# Patient Record
Sex: MALE | Race: WHITE | NOT HISPANIC OR LATINO | Employment: OTHER | URBAN - NONMETROPOLITAN AREA
[De-identification: names, ages, dates, MRNs, and addresses within clinical notes are randomized per-mention and may not be internally consistent; named-entity substitution may affect disease eponyms.]

---

## 2017-01-17 ENCOUNTER — HOSPITAL ENCOUNTER (OUTPATIENT)
Dept: ULTRASOUND IMAGING | Facility: HOSPITAL | Age: 82
Discharge: HOME | End: 2017-01-17
Attending: UROLOGY | Admitting: UROLOGY

## 2017-01-17 ENCOUNTER — OFFICE VISIT (OUTPATIENT)
Dept: UROLOGY | Facility: CLINIC | Age: 82
End: 2017-01-17

## 2017-01-17 VITALS
DIASTOLIC BLOOD PRESSURE: 82 MMHG | SYSTOLIC BLOOD PRESSURE: 140 MMHG | BODY MASS INDEX: 22.75 KG/M2 | HEIGHT: 69 IN | TEMPERATURE: 97.5 F | WEIGHT: 153.6 LBS

## 2017-01-17 DIAGNOSIS — N20.0 KIDNEY STONE: Primary | ICD-10-CM

## 2017-01-17 DIAGNOSIS — N28.1 RENAL CYST: ICD-10-CM

## 2017-01-17 DIAGNOSIS — N20.0 KIDNEY STONE: ICD-10-CM

## 2017-01-17 LAB
BILIRUB BLD-MCNC: NEGATIVE MG/DL
CLARITY, POC: CLEAR
COLOR UR: YELLOW
GLUCOSE UR STRIP-MCNC: NEGATIVE MG/DL
KETONES UR QL: NEGATIVE
LEUKOCYTE EST, POC: NEGATIVE
NITRITE UR-MCNC: NEGATIVE MG/ML
PH UR: 5.5 [PH] (ref 5–8)
PROT UR STRIP-MCNC: NEGATIVE MG/DL
RBC # UR STRIP: NEGATIVE /UL
SP GR UR: 1.02 (ref 1–1.03)
UROBILINOGEN UR QL: NORMAL

## 2017-01-17 PROCEDURE — 81003 URINALYSIS AUTO W/O SCOPE: CPT | Performed by: UROLOGY

## 2017-01-17 PROCEDURE — 76775 US EXAM ABDO BACK WALL LIM: CPT

## 2017-01-17 PROCEDURE — 99214 OFFICE O/P EST MOD 30 MIN: CPT | Performed by: UROLOGY

## 2017-01-17 NOTE — MR AVS SNAPSHOT
Jb Verde   1/17/2017 9:10 AM   Office Visit    Dept Phone:  [phone]   Encounter #:  [id number]    Provider:  Rolf Stanley MD   Department:  [facility] UROLOGY                Your Full Care Plan              Today's Medication Changes          These changes are accurate as of: 1/17/17  9:13 AM.  If you have any questions, ask your nurse or doctor.               Stop taking medication(s)listed here:     ciprofloxacin 500 MG tablet   Commonly known as:  CIPRO   Stopped by:  Rolf Stanley MD                      Your Updated Medication List          This list is accurate as of: 1/17/17  9:13 AM.  Always use your most recent med list.                atorvastatin 80 MG tablet   Commonly known as:  LIPITOR       capsicum 0.075 % topical cream   Commonly known as:  ZOSTRIX       carboxymethylcellulose 0.5 % solution   Commonly known as:  REFRESH PLUS       DEXILANT 60 MG capsule   Generic drug:  dexlansoprazole       docusate sodium 100 MG capsule   Commonly known as:  COLACE       finasteride 5 MG tablet   Commonly known as:  PROSCAR       galantamine ER 8 MG 24 hr capsule   Commonly known as:  RAZADYNE ER       HYDROcodone-acetaminophen  MG per tablet   Commonly known as:  NORCO   Take 1 tablet by mouth 3 (Three) Times a Day As Needed for moderate pain (4-6). AS NEEDED FOR PAIN       ibuprofen 600 MG tablet   Commonly known as:  ADVIL,MOTRIN       loratadine 10 MG tablet   Commonly known as:  CLARITIN       mirtazapine 30 MG tablet   Commonly known as:  REMERON       MULTIVITAMIN ADULT PO       polyethylene glycol packet   Commonly known as:  MIRALAX       potassium chloride 20 MEQ packet   Commonly known as:  KLOR-CON       senna 8.6 MG tablet   Commonly known as:  SENOKOT       sertraline 50 MG tablet   Commonly known as:  ZOLOFT       tamsulosin 0.4 MG capsule 24 hr capsule   Commonly known as:  FLOMAX       VITAMIN D PO               We Performed  "the Following     POC Urinalysis Dipstick, Automated     PTH, Intact & Calcium     Uric Acid     UroStone Max24       You Were Diagnosed With        Codes Comments    Kidney stone    -  Primary ICD-10-CM: N20.0  ICD-9-CM: 592.0     Renal cyst     ICD-10-CM: N28.1  ICD-9-CM: 753.10       Instructions     None    Patient Instructions History      Upcoming Appointments     Visit Type Date Time Department    FOLLOW UP 2017  9:10 AM [facility] UROLOGY PAD    US PAD RENAL BILATERAL 2017  8:30 AM  PAD US BIC    FOLLOW UP 2017  2:30 PM [facility] UROLOGY PAD      MyChart Signup     [facility] [place] [employer] allows you to send messages to your doctor, view your test results, renew your prescriptions, schedule appointments, and more. To sign up, go to Live[employer] and click on the Sign Up Now link in the New User? box. Enter your [employer] Activation Code exactly as it appears below along with the last four digits of your Social Security Number and your Date of Birth () to complete the sign-up process. If you do not sign up before the expiration date, you must request a new code.    [employer] Activation Code: [email or web address]  Expires: 2017  9:13 AM    If you have questions, you can email [email or web address] or call [phone] to talk to our [employer] staff. Remember, [employer] is NOT to be used for urgent needs. For medical emergencies, dial 911.               Other Info from Your Visit           Your Appointments     2017  2:30 PM CST   Follow Up with Rolf Stanley MD   [facility] (--)    [address] [place] [address] [address]  [facility] [id number] [phone]           Arrive 15 minutes prior to appointment.              Allergies     Aspirin      Other reaction(s): GI BLEEDING      Reason for Visit     Flank Pain           Vital Signs     Blood Pressure Temperature Height Weight Body Mass Index Smoking Status    140/82 97.5 °F (36.4 °C) 69\" (175.3 cm) 153 lb 9.6 oz " (69.7 kg) 22.68 kg/m2 Former Smoker      Problems and Diagnoses Noted     Kidney stone    Kidney lump      Results     POC Urinalysis Dipstick, Automated      Component Value Standard Range & Units    Color Yellow Yellow, Straw, Dark Yellow, Adriane    Clarity, UA Clear Clear    Glucose, UA Negative Negative, 1000 mg/dL (3+) mg/dL    Bilirubin Negative Negative    Ketones, UA Negative Negative    Specific Gravity  1.025 1.005 - 1.030    Blood, UA Negative Negative    pH, Urine 5.5 5.0 - 8.0    Protein, POC Negative Negative mg/dL    Urobilinogen, UA Normal Normal    Leukocytes Negative Negative    Nitrite, UA Negative Negative

## 2017-01-17 NOTE — LETTER
January 17, 2017     Tamiko Vizcaino MD  [address] [facility] [place] [address]    Patient: Jb Verde   YOB: 1934   Date of Visit: 1/17/2017       Dear Dr. Carrillo MD:    Thank you for referring Jb Verde to me for evaluation. Below are the relevant portions of my assessment and plan of care.    If you have questions, please do not hesitate to call me. I look forward to following Jb along with you.         Sincerely,        Rolf Stanley MD        CC: No Recipients  Rolf Stanley MD  1/17/2017 11:33 AM  Signed  Mr. Verde is 82 y.o. male    Chief Complaint   Patient presents with   • Nephrolithiasis       History of Present Illness  Recent stone episode  The context of today's visit is following a recent stone episode The patient underwent URS management of the stone  6  week(s) ago. The stone location is in the Upper pole, Lower pole and Interpolar area. The onset of this was acute. The course is improving with this management. Associated symptom(s) can be described by resolution after treatment of stone. The patient did get films to review today.     The following portions of the patient's history were reviewed and updated as appropriate: allergies, current medications, past family history, past medical history, past social history, past surgical history and problem list.    Review of Systems   Constitutional: Negative for chills and fever.   Gastrointestinal: Negative for abdominal pain, anal bleeding and blood in stool.   Genitourinary: Positive for difficulty urinating and frequency. Negative for flank pain, hematuria, penile pain, penile swelling and urgency.         Current Outpatient Prescriptions:   •  atorvastatin (LIPITOR) 80 MG tablet, Take 80 mg by mouth Daily., Disp: , Rfl:   •  capsicum (ZOSTRIX) 0.075 % topical cream, Apply 1 application topically 4 (Four) Times a Day. TO KNEE JOINTS FOR PAIN, Disp: , Rfl:   •  carboxymethylcellulose (REFRESH PLUS) 0.5 %  solution, Administer 1 drop to both eyes 4 (Four) Times a Day As Needed for dry eyes (AS NEEDED DRY EYES)., Disp: , Rfl:   •  Cholecalciferol (VITAMIN D PO), Take 2,000 Units by mouth Daily., Disp: , Rfl:   •  dexlansoprazole (DEXILANT) 60 MG capsule, Take 60 mg by mouth Daily., Disp: , Rfl:   •  docusate sodium (COLACE) 100 MG capsule, Take 100 mg by mouth 2 (Two) Times a Day., Disp: , Rfl:   •  finasteride (PROSCAR) 5 MG tablet, Take 5 mg by mouth Daily., Disp: , Rfl:   •  galantamine ER (RAZADYNE ER) 8 MG 24 hr capsule, Take 8 mg by mouth Daily With Breakfast., Disp: , Rfl:   •  HYDROcodone-acetaminophen (NORCO)  MG per tablet, Take 1 tablet by mouth 3 (Three) Times a Day As Needed for moderate pain (4-6). AS NEEDED FOR PAIN, Disp: 20 tablet, Rfl: 0  •  ibuprofen (ADVIL,MOTRIN) 600 MG tablet, Take 600 mg by mouth 3 (Three) Times a Day., Disp: , Rfl:   •  loratadine (CLARITIN) 10 MG tablet, Take 10 mg by mouth Daily., Disp: , Rfl:   •  mirtazapine (REMERON) 30 MG tablet, Take 30 mg by mouth Every Night. TAKES 1/2 TAB, Disp: , Rfl:   •  Multiple Vitamins-Minerals (MULTIVITAMIN ADULT PO), Take 1 tablet by mouth Daily., Disp: , Rfl:   •  polyethylene glycol (MIRALAX) packet, Take 17 g by mouth Daily., Disp: , Rfl:   •  potassium chloride (KLOR-CON) 20 MEQ packet, Take 20 mEq by mouth Daily. PT TAKING 1/2 20 MEQ TABLET, Disp: , Rfl:   •  senna (SENOKOT) 8.6 MG tablet, Take 1 tablet by mouth 2 (Two) Times a Day., Disp: , Rfl:   •  sertraline (ZOLOFT) 50 MG tablet, Take 50 mg by mouth Daily. TAKES 1/2 TAB, Disp: , Rfl:   •  tamsulosin (FLOMAX) 0.4 MG capsule 24 hr capsule, Take 0.4 mg by mouth Daily. TAKES 2 CAPSULES, Disp: , Rfl:     Past Medical History   Diagnosis Date   • Acid reflux    • Allergic rhinitis    • Arthritis    • Monique esophagus    • Carotid atherosclerosis    • Cognitive impairment    • Delirium    • Folic acid deficiency    • Hyperlipemia    • Hypertension    • Kidney stone    • Nail dystrophy  "   • Osteoarthritis of knee    • Peripheral vascular disease    • Polyp of colon    • Transient cerebral ischemia    • Traumatic amputation of thumb    • Urinary tract infection    • Vitamin B12 deficiency    • Vitamin D deficiency        Past Surgical History   Procedure Laterality Date   • Gallbladder surgery     • Kidney stone surgery     • Other surgical history Right 1958     R/T TRAUMATIC INJURY AT WORK    • Cystoscopy w/ ureteral stent placement Left 11/16/2016     Procedure: CYSTOSCOPY WITH LEFT DOUBLE J STENT INSERTION WITH LEFT EXTRACORPOREAL SHOCKWAVE LITHOTRIPSY;  Surgeon: Rolf Stanley MD;  Location: [facility] OR;  Service:    • Cystoscopy ureteroscopy laser lithotripsy Left 12/5/2016     Procedure: CYSTOSCOPY URETEROSCOPY LASER LITHOTRIPSY;  Surgeon: Rolf Stanley MD;  Location: [facility] OR;  Service:        Social History     Social History   • Marital status:      Spouse name: N/A   • Number of children: N/A   • Years of education: N/A     Social History Main Topics   • Smoking status: Former Smoker   • Smokeless tobacco: Never Used      Comment: 1960   • Alcohol use No   • Drug use: No   • Sexual activity: Not Asked     Other Topics Concern   • None     Social History Narrative       Family History   Problem Relation Age of Onset   • Breast cancer Mother    • Colon cancer Maternal Aunt        Objective    Visit Vitals   • /82   • Temp 97.5 °F (36.4 °C)   • Ht 69\" (175.3 cm)   • Wt 153 lb 9.6 oz (69.7 kg)   • BMI 22.68 kg/m2       Physical Exam    Admission on 12/05/2016, Discharged on 12/05/2016   Component Date Value Ref Range Status   • Case Report 12/05/2016    Final                    Value:Surgical Pathology Report                         Case: [id number]                                  Authorizing Provider:  Rolf Stanley MD     Collected:           12/05/2016 03:45 PM          Ordering Location:     [facility] OR  Received:            12/06/2016 10:08 AM "          Pathologist:           Sirisha Porter MD                                                           Specimen:    Kidney, Left, LEFT KIDNEY STONE FOR ANALYSIS PLEASE                                       • Clinical Information 12/05/2016    Final                    Value:This result contains rich text formatting which cannot be displayed here.   • Final Diagnosis 12/05/2016    Final                    Value:This result contains rich text formatting which cannot be displayed here.   • Gross Description 12/05/2016    Final                    Value:This result contains rich text formatting which cannot be displayed here.       Results for orders placed or performed in visit on 01/17/17   POC Urinalysis Dipstick, Automated   Result Value Ref Range    Color Yellow Yellow, Straw, Dark Yellow, Adriane    Clarity, UA Clear Clear    Glucose, UA Negative Negative, 1000 mg/dL (3+) mg/dL    Bilirubin Negative Negative    Ketones, UA Negative Negative    Specific Gravity  1.025 1.005 - 1.030    Blood, UA Negative Negative    pH, Urine 5.5 5.0 - 8.0    Protein, POC Negative Negative mg/dL    Urobilinogen, UA Normal Normal    Leukocytes Negative Negative    Nitrite, UA Negative Negative     Assessment and Plan    Jb was seen today for nephrolithiasis.    Diagnoses and all orders for this visit:    Kidney stone  -     POC Urinalysis Dipstick, Automated  -     UroStone Max24  -     PTH, Intact & Calcium  -     Uric Acid  Reviewed his ultrasound as below.  There is a question of a left renal stone however I think this is likely artifact as his stones were cleared on his last procedure.  He does however still have right-sided stones.  This is of small moderate stone burden.  I think given his overall health and recommended continuing to watch the stones on the right.  He has family agree.  They understand the signs and symptoms of obstructing stone and will call if any problems.  Given his stone burden I think it would be  important to perform a full metabolic workup with the labs as above.  His stones are calcium oxalate stones.  I will see him back in a few weeks with a 24-hour urine.  We will get labs today.    Mr. Verde has been diagnosed with renal urolithiasis. The patient's options for therapy are discussed based on the size, location, stone burden, and symptoms.  The decision has been made to follow these stones radiographically without treatment.  The patient understands the risks associated with the conservative approach, but this is a reasonable treatment plan.  The need to contract me for hematuria, fever, recurrent UTI's, flank pain or change in ideology is explained.      Renal cyst  The mass on CT scan in question on the right kidney is a benign renal cyst.  No further workup needed.    Renal ultrasound independent review    The renal ultrasound is available for me to review.  Treatment recommendations require an independent review.  This film has been reviewed by the radiologist to determine any non urologic abnormalities that are presents.  However, I very closely inspected the kidneys for size, symmetry, contour, parenchymal thickness, perinephric reaction, presence of calcifications, and intrarenal dilation of the collecting system.       The right kidney appears abnormal on this ultrasound.     Benign-appearing cysts in the upper pole.  Multiple hyperechoic lesions with shadowing consistent with multiple stones.    The left kidney appears abnormal on this ultrasound.     No hydronephrosis.  There is a hyperechoic lesion in the interpolar region with question of shadowing.  This could represent artifact versus a stone    The bladder appears normal on thisultrsaound.  The bladder appears normal in thickness.  There no masses or stones seen on this exam.  Enlarged prostate    EMR Dragon/Transcription disclaimer:  Much of this encounter note is an electronic transcription/translation of spoken language to printed text.  The electronic translation of spoken language may permit erroneous, or at times, nonsensical words or phrases to be inadvertently transcribed; although I have reviewed the note for such errors, some may still exist.

## 2017-01-17 NOTE — PROGRESS NOTES
Mr. Verde is 82 y.o. male    Chief Complaint   Patient presents with   • Nephrolithiasis       History of Present Illness  Recent stone episode  The context of today's visit is following a recent stone episode The patient underwent URS management of the stone  6  week(s) ago. The stone location is in the Upper pole, Lower pole and Interpolar area. The onset of this was acute. The course is improving with this management. Associated symptom(s) can be described by resolution after treatment of stone. The patient did get films to review today.     The following portions of the patient's history were reviewed and updated as appropriate: allergies, current medications, past family history, past medical history, past social history, past surgical history and problem list.    Review of Systems   Constitutional: Negative for chills and fever.   Gastrointestinal: Negative for abdominal pain, anal bleeding and blood in stool.   Genitourinary: Positive for difficulty urinating and frequency. Negative for flank pain, hematuria, penile pain, penile swelling and urgency.         Current Outpatient Prescriptions:   •  atorvastatin (LIPITOR) 80 MG tablet, Take 80 mg by mouth Daily., Disp: , Rfl:   •  capsicum (ZOSTRIX) 0.075 % topical cream, Apply 1 application topically 4 (Four) Times a Day. TO KNEE JOINTS FOR PAIN, Disp: , Rfl:   •  carboxymethylcellulose (REFRESH PLUS) 0.5 % solution, Administer 1 drop to both eyes 4 (Four) Times a Day As Needed for dry eyes (AS NEEDED DRY EYES)., Disp: , Rfl:   •  Cholecalciferol (VITAMIN D PO), Take 2,000 Units by mouth Daily., Disp: , Rfl:   •  dexlansoprazole (DEXILANT) 60 MG capsule, Take 60 mg by mouth Daily., Disp: , Rfl:   •  docusate sodium (COLACE) 100 MG capsule, Take 100 mg by mouth 2 (Two) Times a Day., Disp: , Rfl:   •  finasteride (PROSCAR) 5 MG tablet, Take 5 mg by mouth Daily., Disp: , Rfl:   •  galantamine ER (RAZADYNE ER) 8 MG 24 hr capsule, Take 8 mg by mouth Daily With  Breakfast., Disp: , Rfl:   •  HYDROcodone-acetaminophen (NORCO)  MG per tablet, Take 1 tablet by mouth 3 (Three) Times a Day As Needed for moderate pain (4-6). AS NEEDED FOR PAIN, Disp: 20 tablet, Rfl: 0  •  ibuprofen (ADVIL,MOTRIN) 600 MG tablet, Take 600 mg by mouth 3 (Three) Times a Day., Disp: , Rfl:   •  loratadine (CLARITIN) 10 MG tablet, Take 10 mg by mouth Daily., Disp: , Rfl:   •  mirtazapine (REMERON) 30 MG tablet, Take 30 mg by mouth Every Night. TAKES 1/2 TAB, Disp: , Rfl:   •  Multiple Vitamins-Minerals (MULTIVITAMIN ADULT PO), Take 1 tablet by mouth Daily., Disp: , Rfl:   •  polyethylene glycol (MIRALAX) packet, Take 17 g by mouth Daily., Disp: , Rfl:   •  potassium chloride (KLOR-CON) 20 MEQ packet, Take 20 mEq by mouth Daily. PT TAKING 1/2 20 MEQ TABLET, Disp: , Rfl:   •  senna (SENOKOT) 8.6 MG tablet, Take 1 tablet by mouth 2 (Two) Times a Day., Disp: , Rfl:   •  sertraline (ZOLOFT) 50 MG tablet, Take 50 mg by mouth Daily. TAKES 1/2 TAB, Disp: , Rfl:   •  tamsulosin (FLOMAX) 0.4 MG capsule 24 hr capsule, Take 0.4 mg by mouth Daily. TAKES 2 CAPSULES, Disp: , Rfl:     Past Medical History   Diagnosis Date   • Acid reflux    • Allergic rhinitis    • Arthritis    • Monique esophagus    • Carotid atherosclerosis    • Cognitive impairment    • Delirium    • Folic acid deficiency    • Hyperlipemia    • Hypertension    • Kidney stone    • Nail dystrophy    • Osteoarthritis of knee    • Peripheral vascular disease    • Polyp of colon    • Transient cerebral ischemia    • Traumatic amputation of thumb    • Urinary tract infection    • Vitamin B12 deficiency    • Vitamin D deficiency        Past Surgical History   Procedure Laterality Date   • Gallbladder surgery     • Kidney stone surgery     • Other surgical history Right 1958     R/T TRAUMATIC INJURY AT WORK    • Cystoscopy w/ ureteral stent placement Left 11/16/2016     Procedure: CYSTOSCOPY WITH LEFT DOUBLE J STENT INSERTION WITH LEFT EXTRACORPOREAL  "SHOCKWAVE LITHOTRIPSY;  Surgeon: Rolf Stanley MD;  Location: [facility];  Service:    • Cystoscopy ureteroscopy laser lithotripsy Left 12/5/2016     Procedure: CYSTOSCOPY URETEROSCOPY LASER LITHOTRIPSY;  Surgeon: Rolf Stanley MD;  Location: [facility];  Service:        Social History     Social History   • Marital status:      Spouse name: N/A   • Number of children: N/A   • Years of education: N/A     Social History Main Topics   • Smoking status: Former Smoker   • Smokeless tobacco: Never Used      Comment: 1960   • Alcohol use No   • Drug use: No   • Sexual activity: Not Asked     Other Topics Concern   • None     Social History Narrative       Family History   Problem Relation Age of Onset   • Breast cancer Mother    • Colon cancer Maternal Aunt        Objective    Visit Vitals   • /82   • Temp 97.5 °F (36.4 °C)   • Ht 69\" (175.3 cm)   • Wt 153 lb 9.6 oz (69.7 kg)   • BMI 22.68 kg/m2       Physical Exam    Admission on 12/05/2016, Discharged on 12/05/2016   Component Date Value Ref Range Status   • Case Report 12/05/2016    Final                    Value:Surgical Pathology Report                         Case: [id number]                                  Authorizing Provider:  Rolf Stanley MD     Collected:           12/05/2016 03:45 PM          Ordering Location:     [facility]  Received:            12/06/2016 10:08 AM          Pathologist:           Sirisha Porter MD                                                           Specimen:    Kidney, Left, LEFT KIDNEY STONE FOR ANALYSIS PLEASE                                       • Clinical Information 12/05/2016    Final                    Value:This result contains rich text formatting which cannot be displayed here.   • Final Diagnosis 12/05/2016    Final                    Value:This result contains rich text formatting which cannot be displayed here.   • Gross Description 12/05/2016    Final                    " Value:This result contains rich text formatting which cannot be displayed here.       Results for orders placed or performed in visit on 01/17/17   POC Urinalysis Dipstick, Automated   Result Value Ref Range    Color Yellow Yellow, Straw, Dark Yellow, Adriane    Clarity, UA Clear Clear    Glucose, UA Negative Negative, 1000 mg/dL (3+) mg/dL    Bilirubin Negative Negative    Ketones, UA Negative Negative    Specific Gravity  1.025 1.005 - 1.030    Blood, UA Negative Negative    pH, Urine 5.5 5.0 - 8.0    Protein, POC Negative Negative mg/dL    Urobilinogen, UA Normal Normal    Leukocytes Negative Negative    Nitrite, UA Negative Negative     Assessment and Plan    Jb was seen today for nephrolithiasis.    Diagnoses and all orders for this visit:    Kidney stone  -     POC Urinalysis Dipstick, Automated  -     UroStone Max24  -     PTH, Intact & Calcium  -     Uric Acid  Reviewed his ultrasound as below.  There is a question of a left renal stone however I think this is likely artifact as his stones were cleared on his last procedure.  He does however still have right-sided stones.  This is of small moderate stone burden.  I think given his overall health and recommended continuing to watch the stones on the right.  He has family agree.  They understand the signs and symptoms of obstructing stone and will call if any problems.  Given his stone burden I think it would be important to perform a full metabolic workup with the labs as above.  His stones are calcium oxalate stones.  I will see him back in a few weeks with a 24-hour urine.  We will get labs today.    Mr. Verde has been diagnosed with renal urolithiasis. The patient's options for therapy are discussed based on the size, location, stone burden, and symptoms.  The decision has been made to follow these stones radiographically without treatment.  The patient understands the risks associated with the conservative approach, but this is a reasonable treatment  plan.  The need to contract me for hematuria, fever, recurrent UTI's, flank pain or change in ideology is explained.      Renal cyst  The mass on CT scan in question on the right kidney is a benign renal cyst.  No further workup needed.    Renal ultrasound independent review    The renal ultrasound is available for me to review.  Treatment recommendations require an independent review.  This film has been reviewed by the radiologist to determine any non urologic abnormalities that are presents.  However, I very closely inspected the kidneys for size, symmetry, contour, parenchymal thickness, perinephric reaction, presence of calcifications, and intrarenal dilation of the collecting system.       The right kidney appears abnormal on this ultrasound.     Benign-appearing cysts in the upper pole.  Multiple hyperechoic lesions with shadowing consistent with multiple stones.    The left kidney appears abnormal on this ultrasound.     No hydronephrosis.  There is a hyperechoic lesion in the interpolar region with question of shadowing.  This could represent artifact versus a stone    The bladder appears normal on thisultrsaound.  The bladder appears normal in thickness.  There no masses or stones seen on this exam.  Enlarged prostate    EMR Dragon/Transcription disclaimer:  Much of this encounter note is an electronic transcription/translation of spoken language to printed text. The electronic translation of spoken language may permit erroneous, or at times, nonsensical words or phrases to be inadvertently transcribed; although I have reviewed the note for such errors, some may still exist.

## 2017-01-18 LAB
CALCIUM SERPL-MCNC: 9.4 MG/DL (ref 8.6–10.2)
INTACT PTH: NORMAL
PTH-INTACT SERPL-MCNC: 31 PG/ML (ref 15–65)
URATE SERPL-MCNC: 4.3 MG/DL (ref 3.7–8.6)

## 2017-02-17 ENCOUNTER — OFFICE VISIT (OUTPATIENT)
Dept: UROLOGY | Facility: CLINIC | Age: 82
End: 2017-02-17

## 2017-02-17 VITALS
SYSTOLIC BLOOD PRESSURE: 124 MMHG | DIASTOLIC BLOOD PRESSURE: 66 MMHG | HEIGHT: 69 IN | BODY MASS INDEX: 23.34 KG/M2 | TEMPERATURE: 97.8 F | WEIGHT: 157.6 LBS

## 2017-02-17 DIAGNOSIS — N28.1 RENAL CYST: ICD-10-CM

## 2017-02-17 DIAGNOSIS — N20.0 KIDNEY STONE: Primary | ICD-10-CM

## 2017-02-17 DIAGNOSIS — N40.1 BENIGN NON-NODULAR PROSTATIC HYPERPLASIA WITH LOWER URINARY TRACT SYMPTOMS: ICD-10-CM

## 2017-02-17 LAB
BILIRUB BLD-MCNC: NEGATIVE MG/DL
CLARITY, POC: CLEAR
COLOR UR: YELLOW
GLUCOSE UR STRIP-MCNC: NEGATIVE MG/DL
KETONES UR QL: NEGATIVE
LEUKOCYTE EST, POC: NEGATIVE
NITRITE UR-MCNC: NEGATIVE MG/ML
PH UR: 6.5 [PH] (ref 5–8)
PROT UR STRIP-MCNC: NEGATIVE MG/DL
RBC # UR STRIP: ABNORMAL /UL
SP GR UR: 1.02 (ref 1–1.03)
UROBILINOGEN UR QL: NORMAL

## 2017-02-17 PROCEDURE — 99213 OFFICE O/P EST LOW 20 MIN: CPT | Performed by: UROLOGY

## 2017-02-17 PROCEDURE — 81003 URINALYSIS AUTO W/O SCOPE: CPT | Performed by: UROLOGY

## 2017-02-17 NOTE — PROGRESS NOTES
Mr. Verde is 82 y.o. male    Chief Complaint   Patient presents with   • Nephrolithiasis       History of Present Illness  Recurrent Urolithiasis  Patient presents for recurrent urolithiasis. Severity is best defined as having approximately 0 stone episodes over the last 4 week(s). The most recent stone episode was approximately 6week(s) ago. Anatomically, the patient has experienced bilateral renal  and ureteral calculi. Present stone burden is right renal calculus. Current symptoms/signs possible related to urolithiasis include none. Prevention management includes hydration. Prior procedures include recent ureteroscopy.     The following portions of the patient's history were reviewed and updated as appropriate: allergies, current medications, past family history, past medical history, past social history, past surgical history and problem list.    Review of Systems   Constitutional: Negative for chills and fever.   Gastrointestinal: Negative for abdominal pain, anal bleeding and blood in stool.   Genitourinary: Negative for flank pain, frequency, hematuria, penile pain, penile swelling and urgency.         Current Outpatient Prescriptions:   •  atorvastatin (LIPITOR) 80 MG tablet, Take 80 mg by mouth Daily., Disp: , Rfl:   •  capsicum (ZOSTRIX) 0.075 % topical cream, Apply 1 application topically 4 (Four) Times a Day. TO KNEE JOINTS FOR PAIN, Disp: , Rfl:   •  carboxymethylcellulose (REFRESH PLUS) 0.5 % solution, Administer 1 drop to both eyes 4 (Four) Times a Day As Needed for dry eyes (AS NEEDED DRY EYES)., Disp: , Rfl:   •  Cholecalciferol (VITAMIN D PO), Take 2,000 Units by mouth Daily., Disp: , Rfl:   •  dexlansoprazole (DEXILANT) 60 MG capsule, Take 60 mg by mouth Daily., Disp: , Rfl:   •  docusate sodium (COLACE) 100 MG capsule, Take 100 mg by mouth 2 (Two) Times a Day., Disp: , Rfl:   •  finasteride (PROSCAR) 5 MG tablet, Take 5 mg by mouth Daily., Disp: , Rfl:   •  galantamine ER (RAZADYNE ER) 8 MG 24 hr  capsule, Take 8 mg by mouth Daily With Breakfast., Disp: , Rfl:   •  HYDROcodone-acetaminophen (NORCO)  MG per tablet, Take 1 tablet by mouth 3 (Three) Times a Day As Needed for moderate pain (4-6). AS NEEDED FOR PAIN, Disp: 20 tablet, Rfl: 0  •  ibuprofen (ADVIL,MOTRIN) 600 MG tablet, Take 600 mg by mouth 3 (Three) Times a Day., Disp: , Rfl:   •  loratadine (CLARITIN) 10 MG tablet, Take 10 mg by mouth Daily., Disp: , Rfl:   •  mirtazapine (REMERON) 30 MG tablet, Take 30 mg by mouth Every Night. TAKES 1/2 TAB, Disp: , Rfl:   •  Multiple Vitamins-Minerals (MULTIVITAMIN ADULT PO), Take 1 tablet by mouth Daily., Disp: , Rfl:   •  polyethylene glycol (MIRALAX) packet, Take 17 g by mouth Daily., Disp: , Rfl:   •  potassium chloride (KLOR-CON) 20 MEQ packet, Take 20 mEq by mouth Daily. PT TAKING 1/2 20 MEQ TABLET, Disp: , Rfl:   •  senna (SENOKOT) 8.6 MG tablet, Take 1 tablet by mouth 2 (Two) Times a Day., Disp: , Rfl:   •  sertraline (ZOLOFT) 50 MG tablet, Take 50 mg by mouth Daily. TAKES 1/2 TAB, Disp: , Rfl:   •  tamsulosin (FLOMAX) 0.4 MG capsule 24 hr capsule, Take 0.4 mg by mouth Daily. TAKES 2 CAPSULES, Disp: , Rfl:     Past Medical History   Diagnosis Date   • Acid reflux    • Allergic rhinitis    • Arthritis    • Monique esophagus    • Carotid atherosclerosis    • Cognitive impairment    • Delirium    • Folic acid deficiency    • Hyperlipemia    • Hypertension    • Kidney stone    • Nail dystrophy    • Osteoarthritis of knee    • Peripheral vascular disease    • Polyp of colon    • Transient cerebral ischemia    • Traumatic amputation of thumb    • Urinary tract infection    • Vitamin B12 deficiency    • Vitamin D deficiency        Past Surgical History   Procedure Laterality Date   • Gallbladder surgery     • Kidney stone surgery     • Other surgical history Right 1958     R/T TRAUMATIC INJURY AT WORK    • Cystoscopy w/ ureteral stent placement Left 11/16/2016     Procedure: CYSTOSCOPY WITH LEFT DOUBLE J  "STENT INSERTION WITH LEFT EXTRACORPOREAL SHOCKWAVE LITHOTRIPSY;  Surgeon: Rolf Stanley MD;  Location: [facility] OR;  Service:    • Cystoscopy ureteroscopy laser lithotripsy Left 12/5/2016     Procedure: CYSTOSCOPY URETEROSCOPY LASER LITHOTRIPSY;  Surgeon: Rolf Stanley MD;  Location:  [facility] OR;  Service:        Social History     Social History   • Marital status:      Spouse name: N/A   • Number of children: N/A   • Years of education: N/A     Social History Main Topics   • Smoking status: Former Smoker   • Smokeless tobacco: Never Used      Comment: 1960   • Alcohol use No   • Drug use: No   • Sexual activity: Not Asked     Other Topics Concern   • None     Social History Narrative       Family History   Problem Relation Age of Onset   • Breast cancer Mother    • Colon cancer Maternal Aunt        Objective    Visit Vitals   • /66   • Temp 97.8 °F (36.6 °C)   • Ht 69\" (175.3 cm)   • Wt 157 lb 9.6 oz (71.5 kg)   • BMI 23.27 kg/m2       Physical Exam    Office Visit on 01/17/2017   Component Date Value Ref Range Status   • Color 01/17/2017 Yellow  Yellow, Straw, Dark Yellow, Adriane Final   • Clarity, UA 01/17/2017 Clear  Clear Final   • Glucose, UA 01/17/2017 Negative  Negative, 1000 mg/dL (3+) mg/dL Final   • Bilirubin 01/17/2017 Negative  Negative Final   • Ketones, UA 01/17/2017 Negative  Negative Final   • Specific Gravity  01/17/2017 1.025  1.005 - 1.030 Final   • Blood, UA 01/17/2017 Negative  Negative Final   • pH, Urine 01/17/2017 5.5  5.0 - 8.0 Final   • Protein, POC 01/17/2017 Negative  Negative mg/dL Final   • Urobilinogen, UA 01/17/2017 Normal  Normal Final   • Leukocytes 01/17/2017 Negative  Negative Final   • Nitrite, UA 01/17/2017 Negative  Negative Final   • Calcium 01/17/2017 9.4  8.6 - 10.2 mg/dL Final   • PTH, Intact 01/17/2017 31  15 - 65 pg/mL Final   • PTH, Intact 01/17/2017 Comment   Final    Comment: Interpretation                 Intact PTH    Calcium                     "              (pg/mL)      (mg/dL)  Normal                          15 - 65     8.6 - 10.2  Primary Hyperparathyroidism         >65          >10.2  Secondary Hyperparathyroidism       >65          <10.2  Non-Parathyroid Hypercalcemia       <65          >10.2  Hypoparathyroidism                  <15          < 8.6  Non-Parathyroid Hypocalcemia    15 - 65          < 8.6     • Uric Acid 01/17/2017 4.3  3.7 - 8.6 mg/dL Final               Therapeutic target for gout patients: <6.0       Results for orders placed or performed in visit on 02/17/17   POC Urinalysis Dipstick, Automated   Result Value Ref Range    Color Yellow Yellow, Straw, Dark Yellow, Adriane    Clarity, UA Clear Clear    Glucose, UA Negative Negative, 1000 mg/dL (3+) mg/dL    Bilirubin Negative Negative    Ketones, UA Negative Negative    Specific Gravity  1.025 1.005 - 1.030    Blood, UA Trace (A) Negative    pH, Urine 6.5 5.0 - 8.0    Protein, POC Negative Negative mg/dL    Urobilinogen, UA Normal Normal    Leukocytes Negative Negative    Nitrite, UA Negative Negative     Assessment and Plan    Jb was seen today for nephrolithiasis.    Diagnoses and all orders for this visit:    Kidney stone  -     POC Urinalysis Dipstick, Automated  -     US Renal Bilateral; Future  PTH uric acid and serum calcium within normal limits.  His 24-hour urine showed severe volume depletion.  I discussed this with his daughter and she says that they may have missed 1 or 2 urinations but that is typical for him.  I think he is staying significantly dehydrated so I discussed with him increasing his fluid intake and I think this will help his overall stones.  I think he needs to be followed yearly with a renal ultrasound.  Come back in a year with an ultrasound.  I think [facility] happy to [facility] him if this cannot be approved there.    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 The electronic translation of spoken language may permit erroneous, or at times, nonsensical words or phrases to be inadvertently transcribed; although I have reviewed the note for such errors, some may still exist.

## 2019-01-01 ENCOUNTER — APPOINTMENT (OUTPATIENT)
Dept: GENERAL RADIOLOGY | Facility: HOSPITAL | Age: 84
End: 2019-01-01

## 2019-01-01 ENCOUNTER — APPOINTMENT (OUTPATIENT)
Dept: CARDIOLOGY | Facility: HOSPITAL | Age: 84
End: 2019-01-01
Attending: INTERNAL MEDICINE

## 2019-01-01 ENCOUNTER — APPOINTMENT (OUTPATIENT)
Dept: ULTRASOUND IMAGING | Facility: HOSPITAL | Age: 84
End: 2019-01-01

## 2019-01-01 ENCOUNTER — HOSPITAL ENCOUNTER (INPATIENT)
Facility: HOSPITAL | Age: 84
LOS: 4 days | End: 2019-02-06
Attending: EMERGENCY MEDICINE | Admitting: FAMILY MEDICINE

## 2019-01-01 ENCOUNTER — APPOINTMENT (OUTPATIENT)
Dept: CT IMAGING | Facility: HOSPITAL | Age: 84
End: 2019-01-01

## 2019-01-01 VITALS
WEIGHT: 121.4 LBS | BODY MASS INDEX: 20.73 KG/M2 | DIASTOLIC BLOOD PRESSURE: 74 MMHG | RESPIRATION RATE: 19 BRPM | HEIGHT: 64 IN | SYSTOLIC BLOOD PRESSURE: 101 MMHG | TEMPERATURE: 97.8 F | HEART RATE: 101 BPM | OXYGEN SATURATION: 80 %

## 2019-01-01 DIAGNOSIS — R06.03 RESPIRATORY DISTRESS: Primary | ICD-10-CM

## 2019-01-01 DIAGNOSIS — A41.9 SEPSIS, DUE TO UNSPECIFIED ORGANISM: ICD-10-CM

## 2019-01-01 DIAGNOSIS — R13.12 OROPHARYNGEAL DYSPHAGIA: ICD-10-CM

## 2019-01-01 DIAGNOSIS — E87.20 METABOLIC ACIDOSIS: ICD-10-CM

## 2019-01-01 DIAGNOSIS — N17.9 AKI (ACUTE KIDNEY INJURY) (HCC): ICD-10-CM

## 2019-01-01 LAB
A-A DO2: ABNORMAL MMHG
ALBUMIN SERPL-MCNC: 2.3 G/DL (ref 3.5–5)
ALBUMIN SERPL-MCNC: 2.3 G/DL (ref 3.5–5)
ALBUMIN SERPL-MCNC: 3.1 G/DL (ref 3.5–5)
ALBUMIN/GLOB SERPL: 1 G/DL (ref 1.1–2.5)
ALBUMIN/GLOB SERPL: 1.1 G/DL (ref 1.1–2.5)
ALBUMIN/GLOB SERPL: 1.1 G/DL (ref 1.1–2.5)
ALP SERPL-CCNC: 51 U/L (ref 24–120)
ALP SERPL-CCNC: 69 U/L (ref 24–120)
ALP SERPL-CCNC: 85 U/L (ref 24–120)
ALT SERPL W P-5'-P-CCNC: 41 U/L (ref 0–54)
ALT SERPL W P-5'-P-CCNC: 45 U/L (ref 0–54)
ALT SERPL W P-5'-P-CCNC: 52 U/L (ref 0–54)
ANION GAP SERPL CALCULATED.3IONS-SCNC: 10 MMOL/L (ref 4–13)
ANION GAP SERPL CALCULATED.3IONS-SCNC: 12 MMOL/L (ref 4–13)
ANION GAP SERPL CALCULATED.3IONS-SCNC: 6 MMOL/L (ref 4–13)
ANION GAP SERPL CALCULATED.3IONS-SCNC: 7 MMOL/L (ref 4–13)
APTT PPP: 52.2 SECONDS (ref 24.1–34.8)
ARTERIAL PATENCY WRIST A: ABNORMAL
ARTERIAL PATENCY WRIST A: POSITIVE
AST SERPL-CCNC: 39 U/L (ref 7–45)
AST SERPL-CCNC: 39 U/L (ref 7–45)
AST SERPL-CCNC: 41 U/L (ref 7–45)
ATMOSPHERIC PRESS: 747 MMHG
ATMOSPHERIC PRESS: 747 MMHG
ATMOSPHERIC PRESS: 749 MMHG
ATMOSPHERIC PRESS: 751 MMHG
ATMOSPHERIC PRESS: 752 MMHG
ATMOSPHERIC PRESS: 753 MMHG
ATMOSPHERIC PRESS: 754 MMHG
B-LACTAMASE USUAL SUSC ISLT: POSITIVE
BACTERIA BLD CULT: ABNORMAL
BACTERIA SPEC AEROBE CULT: ABNORMAL
BACTERIA SPEC AEROBE CULT: ABNORMAL
BACTERIA UR QL AUTO: ABNORMAL /HPF
BASE EXCESS BLDA CALC-SCNC: -16.1 MMOL/L (ref 0–2)
BASE EXCESS BLDA CALC-SCNC: -22.8 MMOL/L (ref 0–2)
BASE EXCESS BLDA CALC-SCNC: -9.3 MMOL/L (ref 0–2)
BASE EXCESS BLDA CALC-SCNC: 2.5 MMOL/L (ref 0–2)
BASE EXCESS BLDA CALC-SCNC: 3.8 MMOL/L (ref 0–2)
BASE EXCESS BLDA CALC-SCNC: 4.6 MMOL/L (ref 0–2)
BASE EXCESS BLDA CALC-SCNC: 4.9 MMOL/L (ref 0–2)
BDY SITE: ABNORMAL
BH CV ECHO MEAS - AO MAX PG (FULL): 0.34 MMHG
BH CV ECHO MEAS - AO MAX PG: 4.6 MMHG
BH CV ECHO MEAS - AO MEAN PG (FULL): 0 MMHG
BH CV ECHO MEAS - AO MEAN PG: 2 MMHG
BH CV ECHO MEAS - AO ROOT AREA (BSA CORRECTED): 1.7
BH CV ECHO MEAS - AO ROOT AREA: 5.7 CM^2
BH CV ECHO MEAS - AO ROOT DIAM: 2.7 CM
BH CV ECHO MEAS - AO V2 MAX: 107 CM/SEC
BH CV ECHO MEAS - AO V2 MEAN: 60.7 CM/SEC
BH CV ECHO MEAS - AO V2 VTI: 15.4 CM
BH CV ECHO MEAS - AVA(I,A): 3 CM^2
BH CV ECHO MEAS - AVA(I,D): 3 CM^2
BH CV ECHO MEAS - AVA(V,A): 2.7 CM^2
BH CV ECHO MEAS - AVA(V,D): 2.7 CM^2
BH CV ECHO MEAS - BSA(HAYCOCK): 1.6 M^2
BH CV ECHO MEAS - BSA: 1.6 M^2
BH CV ECHO MEAS - BZI_BMI: 20.8 KILOGRAMS/M^2
BH CV ECHO MEAS - BZI_METRIC_HEIGHT: 162.6 CM
BH CV ECHO MEAS - BZI_METRIC_WEIGHT: 54.9 KG
BH CV ECHO MEAS - EDV(CUBED): 103.8 ML
BH CV ECHO MEAS - EDV(TEICH): 102.4 ML
BH CV ECHO MEAS - EF(CUBED): 51.2 %
BH CV ECHO MEAS - EF(TEICH): 43.2 %
BH CV ECHO MEAS - ESV(CUBED): 50.7 ML
BH CV ECHO MEAS - ESV(TEICH): 58.1 ML
BH CV ECHO MEAS - FS: 21.3 %
BH CV ECHO MEAS - IVS/LVPW: 1
BH CV ECHO MEAS - IVSD: 0.7 CM
BH CV ECHO MEAS - LA DIMENSION: 2.8 CM
BH CV ECHO MEAS - LA/AO: 1
BH CV ECHO MEAS - LAT PEAK E' VEL: 4.9 CM/SEC
BH CV ECHO MEAS - LV MASS(C)D: 103.1 GRAMS
BH CV ECHO MEAS - LV MASS(C)DI: 65.2 GRAMS/M^2
BH CV ECHO MEAS - LV MAX PG: 4.2 MMHG
BH CV ECHO MEAS - LV MEAN PG: 2 MMHG
BH CV ECHO MEAS - LV V1 MAX: 103 CM/SEC
BH CV ECHO MEAS - LV V1 MEAN: 63.5 CM/SEC
BH CV ECHO MEAS - LV V1 VTI: 16.4 CM
BH CV ECHO MEAS - LVIDD: 4.7 CM
BH CV ECHO MEAS - LVIDS: 3.7 CM
BH CV ECHO MEAS - LVOT AREA (M): 2.8 CM^2
BH CV ECHO MEAS - LVOT AREA: 2.8 CM^2
BH CV ECHO MEAS - LVOT DIAM: 1.9 CM
BH CV ECHO MEAS - LVPWD: 0.7 CM
BH CV ECHO MEAS - MED PEAK E' VEL: 5.22 CM/SEC
BH CV ECHO MEAS - MV A MAX VEL: 116 CM/SEC
BH CV ECHO MEAS - MV DEC TIME: 0.26 SEC
BH CV ECHO MEAS - MV E MAX VEL: 62.1 CM/SEC
BH CV ECHO MEAS - MV E/A: 0.54
BH CV ECHO MEAS - RAP SYSTOLE: 5 MMHG
BH CV ECHO MEAS - RVSP: 13.9 MMHG
BH CV ECHO MEAS - SI(AO): 55.8 ML/M^2
BH CV ECHO MEAS - SI(CUBED): 33.7 ML/M^2
BH CV ECHO MEAS - SI(LVOT): 29.4 ML/M^2
BH CV ECHO MEAS - SI(TEICH): 28 ML/M^2
BH CV ECHO MEAS - SV(AO): 88.2 ML
BH CV ECHO MEAS - SV(CUBED): 53.2 ML
BH CV ECHO MEAS - SV(LVOT): 46.5 ML
BH CV ECHO MEAS - SV(TEICH): 44.2 ML
BH CV ECHO MEAS - TR MAX VEL: 149 CM/SEC
BH CV ECHO MEASUREMENTS AVERAGE E/E' RATIO: 12.27
BILIRUB SERPL-MCNC: 1 MG/DL (ref 0.1–1)
BILIRUB SERPL-MCNC: 1.1 MG/DL (ref 0.1–1)
BILIRUB SERPL-MCNC: 1.1 MG/DL (ref 0.1–1)
BILIRUB UR QL STRIP: ABNORMAL
BODY TEMPERATURE: 37 C
BUN BLD-MCNC: 20 MG/DL (ref 5–21)
BUN BLD-MCNC: 21 MG/DL (ref 5–21)
BUN BLD-MCNC: 22 MG/DL (ref 5–21)
BUN BLD-MCNC: 23 MG/DL (ref 5–21)
BUN/CREAT SERPL: 13.4 (ref 7–25)
BUN/CREAT SERPL: 19.1 (ref 7–25)
BUN/CREAT SERPL: 21.1 (ref 7–25)
BUN/CREAT SERPL: 21.7 (ref 7–25)
BURR CELLS BLD QL SMEAR: ABNORMAL
BURR CELLS BLD QL SMEAR: ABNORMAL
CALCIUM SPEC-SCNC: 6.9 MG/DL (ref 8.4–10.4)
CALCIUM SPEC-SCNC: 7 MG/DL (ref 8.4–10.4)
CALCIUM SPEC-SCNC: 7.3 MG/DL (ref 8.4–10.4)
CALCIUM SPEC-SCNC: 8.4 MG/DL (ref 8.4–10.4)
CHLORIDE SERPL-SCNC: 117 MMOL/L (ref 98–110)
CHLORIDE SERPL-SCNC: 119 MMOL/L (ref 98–110)
CHLORIDE SERPL-SCNC: 121 MMOL/L (ref 98–110)
CHLORIDE SERPL-SCNC: 124 MMOL/L (ref 98–110)
CLARITY UR: ABNORMAL
CLUMPED PLATELETS: PRESENT
CO2 SERPL-SCNC: 18 MMOL/L (ref 24–31)
CO2 SERPL-SCNC: 24 MMOL/L (ref 24–31)
CO2 SERPL-SCNC: 27 MMOL/L (ref 24–31)
CO2 SERPL-SCNC: 27 MMOL/L (ref 24–31)
COHGB MFR BLD: 0.6 % (ref 0–5)
COLOR UR: ABNORMAL
CREAT BLD-MCNC: 0.92 MG/DL (ref 0.5–1.4)
CREAT BLD-MCNC: 1.09 MG/DL (ref 0.5–1.4)
CREAT BLD-MCNC: 1.15 MG/DL (ref 0.5–1.4)
CREAT BLD-MCNC: 1.57 MG/DL (ref 0.5–1.4)
D-LACTATE SERPL-SCNC: 3.4 MMOL/L (ref 0.5–2)
D-LACTATE SERPL-SCNC: 4.7 MMOL/L (ref 0.5–2)
D-LACTATE SERPL-SCNC: 5.7 MMOL/L (ref 0.5–2)
D-LACTATE SERPL-SCNC: 6 MMOL/L (ref 0.5–2)
D-LACTATE SERPL-SCNC: 6.4 MMOL/L (ref 0.5–2)
D-LACTATE SERPL-SCNC: 7.2 MMOL/L (ref 0.5–2)
D-LACTATE SERPL-SCNC: 7.9 MMOL/L (ref 0.5–2)
D-LACTATE SERPL-SCNC: 8.2 MMOL/L (ref 0.5–2)
D-LACTATE SERPL-SCNC: 9 MMOL/L (ref 0.5–2)
DEPRECATED RDW RBC AUTO: 51 FL (ref 40–54)
DEPRECATED RDW RBC AUTO: 51.2 FL (ref 40–54)
DEPRECATED RDW RBC AUTO: 51.6 FL (ref 40–54)
DEPRECATED RDW RBC AUTO: 54.2 FL (ref 40–54)
EPAP: 5
ERYTHROCYTE [DISTWIDTH] IN BLOOD BY AUTOMATED COUNT: 15.6 % (ref 12–15)
ERYTHROCYTE [DISTWIDTH] IN BLOOD BY AUTOMATED COUNT: 15.8 % (ref 12–15)
ERYTHROCYTE [DISTWIDTH] IN BLOOD BY AUTOMATED COUNT: 15.9 % (ref 12–15)
ERYTHROCYTE [DISTWIDTH] IN BLOOD BY AUTOMATED COUNT: 15.9 % (ref 12–15)
FLUAV AG NPH QL: NEGATIVE
FLUBV AG NPH QL IA: NEGATIVE
GAS FLOW AIRWAY: 4 LPM
GFR SERPL CREATININE-BSD FRML MDRD: 42 ML/MIN/1.73
GFR SERPL CREATININE-BSD FRML MDRD: 61 ML/MIN/1.73
GFR SERPL CREATININE-BSD FRML MDRD: 64 ML/MIN/1.73
GFR SERPL CREATININE-BSD FRML MDRD: 78 ML/MIN/1.73
GLOBULIN UR ELPH-MCNC: 2.1 GM/DL
GLOBULIN UR ELPH-MCNC: 2.3 GM/DL
GLOBULIN UR ELPH-MCNC: 2.9 GM/DL
GLUCOSE BLD-MCNC: 106 MG/DL (ref 70–100)
GLUCOSE BLD-MCNC: 180 MG/DL (ref 70–100)
GLUCOSE BLD-MCNC: 202 MG/DL (ref 70–100)
GLUCOSE BLD-MCNC: 85 MG/DL (ref 70–100)
GLUCOSE BLDC GLUCOMTR-MCNC: 136 MG/DL (ref 70–130)
GLUCOSE BLDC GLUCOMTR-MCNC: 52 MG/DL (ref 70–130)
GLUCOSE BLDC GLUCOMTR-MCNC: 54 MG/DL (ref 70–130)
GLUCOSE UR STRIP-MCNC: NEGATIVE MG/DL
GRAM STN SPEC: ABNORMAL
HCO3 BLDA-SCNC: 15.4 MMOL/L (ref 20–26)
HCO3 BLDA-SCNC: 16.7 MMOL/L (ref 20–26)
HCO3 BLDA-SCNC: 24.7 MMOL/L (ref 20–26)
HCO3 BLDA-SCNC: 25.6 MMOL/L (ref 20–26)
HCO3 BLDA-SCNC: 27.1 MMOL/L (ref 20–26)
HCO3 BLDA-SCNC: 27.1 MMOL/L (ref 20–26)
HCO3 BLDA-SCNC: 6.6 MMOL/L (ref 20–26)
HCT VFR BLD AUTO: 38.2 % (ref 40–52)
HCT VFR BLD AUTO: 38.3 % (ref 40–52)
HCT VFR BLD AUTO: 38.5 % (ref 40–52)
HCT VFR BLD AUTO: 48.2 % (ref 40–52)
HCT VFR BLD CALC: 47.5 % (ref 38–51)
HGB BLD-MCNC: 12.2 G/DL (ref 14–18)
HGB BLD-MCNC: 12.4 G/DL (ref 14–18)
HGB BLD-MCNC: 12.5 G/DL (ref 14–18)
HGB BLD-MCNC: 14.5 G/DL (ref 14–18)
HGB BLDA-MCNC: 15.5 G/DL (ref 14–18)
HGB UR QL STRIP.AUTO: ABNORMAL
HOLD SPECIMEN: NORMAL
HOROWITZ INDEX BLD+IHG-RTO: 100 %
HOROWITZ INDEX BLD+IHG-RTO: 100 %
HOROWITZ INDEX BLD+IHG-RTO: 30 %
HOROWITZ INDEX BLD+IHG-RTO: 60 %
HOROWITZ INDEX BLD+IHG-RTO: 60 %
HOROWITZ INDEX BLD+IHG-RTO: 70 %
HYALINE CASTS UR QL AUTO: ABNORMAL /LPF
INR PPP: 1.5 (ref 0.91–1.09)
INR PPP: 1.9 (ref 0.91–1.09)
IPAP: 12
ISOLATED FROM: ABNORMAL
KETONES UR QL STRIP: ABNORMAL
L PNEUMO1 AG UR QL IA: NEGATIVE
LEFT ATRIUM VOLUME INDEX: 26.4 ML/M2
LEFT ATRIUM VOLUME: 41.7 CM3
LEUKOCYTE ESTERASE UR QL STRIP.AUTO: ABNORMAL
LYMPHOCYTES # BLD MANUAL: 0.5 10*3/MM3 (ref 0.72–4.86)
LYMPHOCYTES # BLD MANUAL: 0.59 10*3/MM3 (ref 0.72–4.86)
LYMPHOCYTES # BLD MANUAL: 3.99 10*3/MM3 (ref 0.72–4.86)
LYMPHOCYTES NFR BLD MANUAL: 1 % (ref 4–12)
LYMPHOCYTES NFR BLD MANUAL: 1 % (ref 4–12)
LYMPHOCYTES NFR BLD MANUAL: 18.2 % (ref 15–45)
LYMPHOCYTES NFR BLD MANUAL: 3 % (ref 4–12)
LYMPHOCYTES NFR BLD MANUAL: 6 % (ref 15–45)
LYMPHOCYTES NFR BLD MANUAL: 6.1 % (ref 15–45)
Lab: ABNORMAL
MAGNESIUM SERPL-MCNC: 1.3 MG/DL (ref 1.4–2.2)
MAGNESIUM SERPL-MCNC: 1.4 MG/DL (ref 1.4–2.2)
MAGNESIUM SERPL-MCNC: 1.8 MG/DL (ref 1.4–2.2)
MAGNESIUM SERPL-MCNC: 1.9 MG/DL (ref 1.4–2.2)
MAXIMAL PREDICTED HEART RATE: 136 BPM
MCH RBC QN AUTO: 28.6 PG (ref 28–32)
MCH RBC QN AUTO: 28.9 PG (ref 28–32)
MCH RBC QN AUTO: 29 PG (ref 28–32)
MCH RBC QN AUTO: 29.6 PG (ref 28–32)
MCHC RBC AUTO-ENTMCNC: 30.1 G/DL (ref 33–36)
MCHC RBC AUTO-ENTMCNC: 31.9 G/DL (ref 33–36)
MCHC RBC AUTO-ENTMCNC: 32.4 G/DL (ref 33–36)
MCHC RBC AUTO-ENTMCNC: 32.5 G/DL (ref 33–36)
MCV RBC AUTO: 89.5 FL (ref 82–95)
MCV RBC AUTO: 89.7 FL (ref 82–95)
MCV RBC AUTO: 91 FL (ref 82–95)
MCV RBC AUTO: 96 FL (ref 82–95)
METAMYELOCYTES NFR BLD MANUAL: 3 % (ref 0–0)
METAMYELOCYTES NFR BLD MANUAL: 5 % (ref 0–0)
METHGB BLD QL: 1 % (ref 0–3)
MODALITY: ABNORMAL
MONOCYTES # BLD AUTO: 0.08 10*3/MM3 (ref 0.19–1.3)
MONOCYTES # BLD AUTO: 0.22 10*3/MM3 (ref 0.19–1.3)
MONOCYTES # BLD AUTO: 0.29 10*3/MM3 (ref 0.19–1.3)
MYELOCYTES NFR BLD MANUAL: 2 % (ref 0–0)
NEUTROPHILS # BLD AUTO: 17.71 10*3/MM3 (ref 1.87–8.4)
NEUTROPHILS # BLD AUTO: 6.79 10*3/MM3 (ref 1.87–8.4)
NEUTROPHILS # BLD AUTO: 7.96 10*3/MM3 (ref 1.87–8.4)
NEUTROPHILS NFR BLD MANUAL: 58.6 % (ref 39–78)
NEUTROPHILS NFR BLD MANUAL: 59 % (ref 39–78)
NEUTROPHILS NFR BLD MANUAL: 66.7 % (ref 39–78)
NEUTS BAND NFR BLD MANUAL: 14.1 % (ref 0–10)
NEUTS BAND NFR BLD MANUAL: 23 % (ref 0–10)
NEUTS BAND NFR BLD MANUAL: 24.2 % (ref 0–10)
NITRITE UR QL STRIP: NEGATIVE
NOTE: ABNORMAL
NOTIFIED BY: ABNORMAL
NOTIFIED WHO: ABNORMAL
NT-PROBNP SERPL-MCNC: 1810 PG/ML (ref 0–1800)
OXYHGB MFR BLDV: 79.3 % (ref 94–99)
PAW @ PEAK INSP FLOW SETTING VENT: 20 CMH2O
PCO2 BLDA: 28.7 MM HG (ref 35–45)
PCO2 BLDA: 29.6 MM HG (ref 35–45)
PCO2 BLDA: 29.7 MM HG (ref 35–45)
PCO2 BLDA: 30.4 MM HG (ref 35–45)
PCO2 BLDA: 31.5 MM HG (ref 35–45)
PCO2 BLDA: 32.4 MM HG (ref 35–45)
PCO2 BLDA: 69.9 MM HG (ref 35–45)
PCO2 TEMP ADJ BLD: ABNORMAL MM HG (ref 35–45)
PEEP RESPIRATORY: 5 CM[H2O]
PH BLDA: 6.97 PH UNITS (ref 7.35–7.45)
PH BLDA: 6.99 PH UNITS (ref 7.35–7.45)
PH BLDA: 7.32 PH UNITS (ref 7.35–7.45)
PH BLDA: 7.52 PH UNITS (ref 7.35–7.45)
PH BLDA: 7.53 PH UNITS (ref 7.35–7.45)
PH BLDA: 7.54 PH UNITS (ref 7.35–7.45)
PH BLDA: 7.54 PH UNITS (ref 7.35–7.45)
PH UR STRIP.AUTO: 5.5 [PH] (ref 5–8)
PH, TEMP CORRECTED: ABNORMAL PH UNITS (ref 7.35–7.45)
PHOSPHATE SERPL-MCNC: 2.1 MG/DL (ref 2.5–4.5)
PHOSPHATE SERPL-MCNC: 3 MG/DL (ref 2.5–4.5)
PLAT MORPH BLD: NORMAL
PLAT MORPH BLD: NORMAL
PLATELET # BLD AUTO: 126 10*3/MM3 (ref 130–400)
PLATELET # BLD AUTO: 153 10*3/MM3 (ref 130–400)
PLATELET # BLD AUTO: 159 10*3/MM3 (ref 130–400)
PLATELET # BLD AUTO: 267 10*3/MM3 (ref 130–400)
PMV BLD AUTO: 11.6 FL (ref 6–12)
PMV BLD AUTO: 11.6 FL (ref 6–12)
PMV BLD AUTO: 11.8 FL (ref 6–12)
PMV BLD AUTO: 12.1 FL (ref 6–12)
PO2 BLDA: 46.7 MM HG (ref 83–108)
PO2 BLDA: 52.8 MM HG (ref 83–108)
PO2 BLDA: 63.8 MM HG (ref 83–108)
PO2 BLDA: 69.6 MM HG (ref 83–108)
PO2 BLDA: 71 MM HG (ref 83–108)
PO2 BLDA: 93.9 MM HG (ref 83–108)
PO2 BLDA: 95 MM HG (ref 83–108)
PO2 TEMP ADJ BLD: ABNORMAL MM HG (ref 83–108)
POIKILOCYTOSIS BLD QL SMEAR: ABNORMAL
POLYCHROMASIA BLD QL SMEAR: ABNORMAL
POLYCHROMASIA BLD QL SMEAR: ABNORMAL
POTASSIUM BLD-SCNC: 2.8 MMOL/L (ref 3.5–5.3)
POTASSIUM BLD-SCNC: 3.1 MMOL/L (ref 3.5–5.3)
POTASSIUM BLD-SCNC: 3.3 MMOL/L (ref 3.5–5.3)
POTASSIUM BLD-SCNC: 4.2 MMOL/L (ref 3.5–5.3)
POTASSIUM BLDA-SCNC: 4.2 MMOL/L (ref 3.5–5.2)
PROCALCITONIN SERPL-MCNC: 7.89 NG/ML
PROT SERPL-MCNC: 4.4 G/DL (ref 6.3–8.7)
PROT SERPL-MCNC: 4.6 G/DL (ref 6.3–8.7)
PROT SERPL-MCNC: 6 G/DL (ref 6.3–8.7)
PROT UR QL STRIP: ABNORMAL
PROTHROMBIN TIME: 18.6 SECONDS (ref 11.9–14.6)
PROTHROMBIN TIME: 22.5 SECONDS (ref 11.9–14.6)
PSV: 5 CMH2O
RBC # BLD AUTO: 4.23 10*6/MM3 (ref 4.8–5.9)
RBC # BLD AUTO: 4.26 10*6/MM3 (ref 4.8–5.9)
RBC # BLD AUTO: 4.28 10*6/MM3 (ref 4.8–5.9)
RBC # BLD AUTO: 5.02 10*6/MM3 (ref 4.8–5.9)
RBC # UR: ABNORMAL /HPF
REF LAB TEST METHOD: ABNORMAL
S PNEUM AG SPEC QL LA: NEGATIVE
SAO2 % BLDCOA: 67.9 % (ref 94–99)
SAO2 % BLDCOA: 80.6 % (ref 94–99)
SAO2 % BLDCOA: 85.8 % (ref 94–99)
SAO2 % BLDCOA: 93.4 % (ref 94–99)
SAO2 % BLDCOA: 94.5 % (ref 94–99)
SAO2 % BLDCOA: 97.9 % (ref 94–99)
SAO2 % BLDCOA: 98.3 % (ref 94–99)
SET MECH RESP RATE: 12
SET MECH RESP RATE: 12
SET MECH RESP RATE: 14
SET MECH RESP RATE: 20
SODIUM BLD-SCNC: 151 MMOL/L (ref 135–145)
SODIUM BLD-SCNC: 153 MMOL/L (ref 135–145)
SODIUM BLD-SCNC: 154 MMOL/L (ref 135–145)
SODIUM BLD-SCNC: 154 MMOL/L (ref 135–145)
SODIUM BLDA-SCNC: 161 MMOL/L (ref 136–145)
SP GR UR STRIP: 1.02 (ref 1–1.03)
SQUAMOUS #/AREA URNS HPF: ABNORMAL /HPF
STRESS TARGET HR: 116 BPM
TROPONIN I SERPL-MCNC: 0.06 NG/ML (ref 0–0.03)
TROPONIN I SERPL-MCNC: 0.09 NG/ML (ref 0–0.03)
TROPONIN I SERPL-MCNC: 0.41 NG/ML (ref 0–0.03)
UROBILINOGEN UR QL STRIP: ABNORMAL
VARIANT LYMPHS NFR BLD MANUAL: 3 % (ref 0–5)
VARIANT LYMPHS NFR BLD MANUAL: 6 % (ref 0–5)
VENTILATOR MODE: ABNORMAL
VENTILATOR MODE: AC
VT ON VENT VENT: 440 ML
VT ON VENT VENT: 440 ML
VT ON VENT VENT: 500 ML
WBC MORPH BLD: NORMAL
WBC NRBC COR # BLD: 15.17 10*3/MM3 (ref 4.8–10.8)
WBC NRBC COR # BLD: 21.92 10*3/MM3 (ref 4.8–10.8)
WBC NRBC COR # BLD: 8.28 10*3/MM3 (ref 4.8–10.8)
WBC NRBC COR # BLD: 9.61 10*3/MM3 (ref 4.8–10.8)
WBC UR QL AUTO: ABNORMAL /HPF

## 2019-01-01 PROCEDURE — 94799 UNLISTED PULMONARY SVC/PX: CPT

## 2019-01-01 PROCEDURE — 83605 ASSAY OF LACTIC ACID: CPT | Performed by: INTERNAL MEDICINE

## 2019-01-01 PROCEDURE — 87086 URINE CULTURE/COLONY COUNT: CPT | Performed by: EMERGENCY MEDICINE

## 2019-01-01 PROCEDURE — 82803 BLOOD GASES ANY COMBINATION: CPT

## 2019-01-01 PROCEDURE — 0BH17EZ INSERTION OF ENDOTRACHEAL AIRWAY INTO TRACHEA, VIA NATURAL OR ARTIFICIAL OPENING: ICD-10-PCS | Performed by: INTERNAL MEDICINE

## 2019-01-01 PROCEDURE — 83605 ASSAY OF LACTIC ACID: CPT | Performed by: EMERGENCY MEDICINE

## 2019-01-01 PROCEDURE — 94003 VENT MGMT INPAT SUBQ DAY: CPT

## 2019-01-01 PROCEDURE — 25010000003 POTASSIUM CHLORIDE 10 MEQ/100ML SOLUTION: Performed by: INTERNAL MEDICINE

## 2019-01-01 PROCEDURE — 25010000002 PROPOFOL 1000 MG/ML EMULSION: Performed by: INTERNAL MEDICINE

## 2019-01-01 PROCEDURE — 87186 SC STD MICRODIL/AGAR DIL: CPT | Performed by: EMERGENCY MEDICINE

## 2019-01-01 PROCEDURE — 36600 WITHDRAWAL OF ARTERIAL BLOOD: CPT

## 2019-01-01 PROCEDURE — 84484 ASSAY OF TROPONIN QUANT: CPT | Performed by: INTERNAL MEDICINE

## 2019-01-01 PROCEDURE — 94760 N-INVAS EAR/PLS OXIMETRY 1: CPT

## 2019-01-01 PROCEDURE — 25010000002 PERFLUTREN 6.52 MG/ML SUSPENSION: Performed by: INTERNAL MEDICINE

## 2019-01-01 PROCEDURE — 94002 VENT MGMT INPAT INIT DAY: CPT

## 2019-01-01 PROCEDURE — 99233 SBSQ HOSP IP/OBS HIGH 50: CPT | Performed by: CLINICAL NURSE SPECIALIST

## 2019-01-01 PROCEDURE — 85025 COMPLETE CBC W/AUTO DIFF WBC: CPT | Performed by: EMERGENCY MEDICINE

## 2019-01-01 PROCEDURE — 87077 CULTURE AEROBIC IDENTIFY: CPT | Performed by: EMERGENCY MEDICINE

## 2019-01-01 PROCEDURE — 94640 AIRWAY INHALATION TREATMENT: CPT

## 2019-01-01 PROCEDURE — 85025 COMPLETE CBC W/AUTO DIFF WBC: CPT | Performed by: INTERNAL MEDICINE

## 2019-01-01 PROCEDURE — 36415 COLL VENOUS BLD VENIPUNCTURE: CPT | Performed by: INTERNAL MEDICINE

## 2019-01-01 PROCEDURE — 93306 TTE W/DOPPLER COMPLETE: CPT | Performed by: INTERNAL MEDICINE

## 2019-01-01 PROCEDURE — 71045 X-RAY EXAM CHEST 1 VIEW: CPT

## 2019-01-01 PROCEDURE — 99498 ADVNCD CARE PLAN ADDL 30 MIN: CPT | Performed by: CLINICAL NURSE SPECIALIST

## 2019-01-01 PROCEDURE — 94770: CPT

## 2019-01-01 PROCEDURE — 84484 ASSAY OF TROPONIN QUANT: CPT | Performed by: EMERGENCY MEDICINE

## 2019-01-01 PROCEDURE — 83735 ASSAY OF MAGNESIUM: CPT | Performed by: INTERNAL MEDICINE

## 2019-01-01 PROCEDURE — 83880 ASSAY OF NATRIURETIC PEPTIDE: CPT | Performed by: EMERGENCY MEDICINE

## 2019-01-01 PROCEDURE — 87040 BLOOD CULTURE FOR BACTERIA: CPT | Performed by: EMERGENCY MEDICINE

## 2019-01-01 PROCEDURE — 94660 CPAP INITIATION&MGMT: CPT

## 2019-01-01 PROCEDURE — 80053 COMPREHEN METABOLIC PANEL: CPT | Performed by: INTERNAL MEDICINE

## 2019-01-01 PROCEDURE — 81001 URINALYSIS AUTO W/SCOPE: CPT | Performed by: EMERGENCY MEDICINE

## 2019-01-01 PROCEDURE — 06HY33Z INSERTION OF INFUSION DEVICE INTO LOWER VEIN, PERCUTANEOUS APPROACH: ICD-10-PCS | Performed by: EMERGENCY MEDICINE

## 2019-01-01 PROCEDURE — 25010000002 MAGNESIUM SULFATE 2 GM/50ML SOLUTION: Performed by: INTERNAL MEDICINE

## 2019-01-01 PROCEDURE — 93010 ELECTROCARDIOGRAM REPORT: CPT | Performed by: INTERNAL MEDICINE

## 2019-01-01 PROCEDURE — 84100 ASSAY OF PHOSPHORUS: CPT | Performed by: INTERNAL MEDICINE

## 2019-01-01 PROCEDURE — 93306 TTE W/DOPPLER COMPLETE: CPT

## 2019-01-01 PROCEDURE — 85610 PROTHROMBIN TIME: CPT | Performed by: EMERGENCY MEDICINE

## 2019-01-01 PROCEDURE — 92610 EVALUATE SWALLOWING FUNCTION: CPT

## 2019-01-01 PROCEDURE — 82375 ASSAY CARBOXYHB QUANT: CPT

## 2019-01-01 PROCEDURE — 85007 BL SMEAR W/DIFF WBC COUNT: CPT | Performed by: INTERNAL MEDICINE

## 2019-01-01 PROCEDURE — 93005 ELECTROCARDIOGRAM TRACING: CPT | Performed by: EMERGENCY MEDICINE

## 2019-01-01 PROCEDURE — 25010000002 VANCOMYCIN 10 G RECONSTITUTED SOLUTION: Performed by: INTERNAL MEDICINE

## 2019-01-01 PROCEDURE — 70450 CT HEAD/BRAIN W/O DYE: CPT

## 2019-01-01 PROCEDURE — 87899 AGENT NOS ASSAY W/OPTIC: CPT | Performed by: INTERNAL MEDICINE

## 2019-01-01 PROCEDURE — C1751 CATH, INF, PER/CENT/MIDLINE: HCPCS

## 2019-01-01 PROCEDURE — 25010000002 ENOXAPARIN PER 10 MG: Performed by: INTERNAL MEDICINE

## 2019-01-01 PROCEDURE — 84145 PROCALCITONIN (PCT): CPT | Performed by: INTERNAL MEDICINE

## 2019-01-01 PROCEDURE — 05HY33Z INSERTION OF INFUSION DEVICE INTO UPPER VEIN, PERCUTANEOUS APPROACH: ICD-10-PCS | Performed by: FAMILY MEDICINE

## 2019-01-01 PROCEDURE — 5A1935Z RESPIRATORY VENTILATION, LESS THAN 24 CONSECUTIVE HOURS: ICD-10-PCS | Performed by: INTERNAL MEDICINE

## 2019-01-01 PROCEDURE — 25010000002 VANCOMYCIN 1 G RECONSTITUTED SOLUTION: Performed by: EMERGENCY MEDICINE

## 2019-01-01 PROCEDURE — 82805 BLOOD GASES W/O2 SATURATION: CPT

## 2019-01-01 PROCEDURE — 82962 GLUCOSE BLOOD TEST: CPT

## 2019-01-01 PROCEDURE — 87184 SC STD DISK METHOD PER PLATE: CPT | Performed by: EMERGENCY MEDICINE

## 2019-01-01 PROCEDURE — 25010000002 PIPERACILLIN SOD-TAZOBACTAM PER 1 G: Performed by: EMERGENCY MEDICINE

## 2019-01-01 PROCEDURE — 99497 ADVNCD CARE PLAN 30 MIN: CPT | Performed by: CLINICAL NURSE SPECIALIST

## 2019-01-01 PROCEDURE — 87185 SC STD ENZYME DETCJ PER NZM: CPT | Performed by: EMERGENCY MEDICINE

## 2019-01-01 PROCEDURE — 85027 COMPLETE CBC AUTOMATED: CPT | Performed by: INTERNAL MEDICINE

## 2019-01-01 PROCEDURE — 25010000002 POTASSIUM CHLORIDE PER 2 MEQ: Performed by: INTERNAL MEDICINE

## 2019-01-01 PROCEDURE — 25010000002 CEFTRIAXONE PER 250 MG: Performed by: FAMILY MEDICINE

## 2019-01-01 PROCEDURE — 83050 HGB METHEMOGLOBIN QUAN: CPT

## 2019-01-01 PROCEDURE — 85007 BL SMEAR W/DIFF WBC COUNT: CPT | Performed by: EMERGENCY MEDICINE

## 2019-01-01 PROCEDURE — 25010000002 PIPERACILLIN SOD-TAZOBACTAM PER 1 G: Performed by: INTERNAL MEDICINE

## 2019-01-01 PROCEDURE — 76775 US EXAM ABDO BACK WALL LIM: CPT

## 2019-01-01 PROCEDURE — 74176 CT ABD & PELVIS W/O CONTRAST: CPT

## 2019-01-01 PROCEDURE — 85610 PROTHROMBIN TIME: CPT | Performed by: INTERNAL MEDICINE

## 2019-01-01 PROCEDURE — 99285 EMERGENCY DEPT VISIT HI MDM: CPT

## 2019-01-01 PROCEDURE — 25010000002 MIDAZOLAM 50 MG/10ML SOLUTION 10 ML VIAL: Performed by: EMERGENCY MEDICINE

## 2019-01-01 PROCEDURE — 87150 DNA/RNA AMPLIFIED PROBE: CPT | Performed by: EMERGENCY MEDICINE

## 2019-01-01 PROCEDURE — 80053 COMPREHEN METABOLIC PANEL: CPT | Performed by: EMERGENCY MEDICINE

## 2019-01-01 PROCEDURE — 87804 INFLUENZA ASSAY W/OPTIC: CPT | Performed by: INTERNAL MEDICINE

## 2019-01-01 PROCEDURE — 85730 THROMBOPLASTIN TIME PARTIAL: CPT | Performed by: INTERNAL MEDICINE

## 2019-01-01 PROCEDURE — 99223 1ST HOSP IP/OBS HIGH 75: CPT | Performed by: CLINICAL NURSE SPECIALIST

## 2019-01-01 RX ORDER — LORAZEPAM 2 MG/ML
1 CONCENTRATE ORAL EVERY 4 HOURS PRN
Status: DISCONTINUED | OUTPATIENT
Start: 2019-01-01 | End: 2019-01-01 | Stop reason: HOSPADM

## 2019-01-01 RX ORDER — POTASSIUM CHLORIDE 14.9 MG/ML
20 INJECTION INTRAVENOUS ONCE
Status: COMPLETED | OUTPATIENT
Start: 2019-01-01 | End: 2019-01-01

## 2019-01-01 RX ORDER — CHLORHEXIDINE GLUCONATE 0.12 MG/ML
15 RINSE ORAL EVERY 12 HOURS SCHEDULED
Status: DISCONTINUED | OUTPATIENT
Start: 2019-01-01 | End: 2019-01-01

## 2019-01-01 RX ORDER — SODIUM CHLORIDE 9 MG/ML
100 INJECTION, SOLUTION INTRAVENOUS CONTINUOUS
Status: DISCONTINUED | OUTPATIENT
Start: 2019-01-01 | End: 2019-01-01

## 2019-01-01 RX ORDER — SODIUM CHLORIDE 450 MG/100ML
100 INJECTION, SOLUTION INTRAVENOUS CONTINUOUS
Status: DISCONTINUED | OUTPATIENT
Start: 2019-01-01 | End: 2019-01-01

## 2019-01-01 RX ORDER — MAGNESIUM SULFATE HEPTAHYDRATE 40 MG/ML
2 INJECTION, SOLUTION INTRAVENOUS ONCE
Status: COMPLETED | OUTPATIENT
Start: 2019-01-01 | End: 2019-01-01

## 2019-01-01 RX ORDER — SODIUM CHLORIDE, SODIUM LACTATE, POTASSIUM CHLORIDE, CALCIUM CHLORIDE 600; 310; 30; 20 MG/100ML; MG/100ML; MG/100ML; MG/100ML
150 INJECTION, SOLUTION INTRAVENOUS CONTINUOUS
Status: DISCONTINUED | OUTPATIENT
Start: 2019-01-01 | End: 2019-01-01

## 2019-01-01 RX ORDER — LORAZEPAM 2 MG/ML
1 CONCENTRATE ORAL EVERY 8 HOURS
Status: DISCONTINUED | OUTPATIENT
Start: 2019-01-01 | End: 2019-01-01 | Stop reason: HOSPADM

## 2019-01-01 RX ORDER — FAMOTIDINE 10 MG/ML
20 INJECTION, SOLUTION INTRAVENOUS DAILY
Status: DISCONTINUED | OUTPATIENT
Start: 2019-01-01 | End: 2019-01-01

## 2019-01-01 RX ORDER — LIDOCAINE HYDROCHLORIDE 10 MG/ML
1 INJECTION, SOLUTION EPIDURAL; INFILTRATION; INTRACAUDAL; PERINEURAL ONCE
Status: COMPLETED | OUTPATIENT
Start: 2019-01-01 | End: 2019-01-01

## 2019-01-01 RX ORDER — MORPHINE SULFATE 20 MG/ML
10 SOLUTION ORAL EVERY 4 HOURS
Status: DISCONTINUED | OUTPATIENT
Start: 2019-01-01 | End: 2019-01-01 | Stop reason: HOSPADM

## 2019-01-01 RX ORDER — POTASSIUM CHLORIDE 1.5 G/1.77G
40 POWDER, FOR SOLUTION ORAL AS NEEDED
Status: DISCONTINUED | OUTPATIENT
Start: 2019-01-01 | End: 2019-01-01

## 2019-01-01 RX ORDER — SODIUM CHLORIDE 0.9 % (FLUSH) 0.9 %
3 SYRINGE (ML) INJECTION EVERY 12 HOURS SCHEDULED
Status: DISCONTINUED | OUTPATIENT
Start: 2019-01-01 | End: 2019-01-01 | Stop reason: HOSPADM

## 2019-01-01 RX ORDER — MAGNESIUM SULFATE HEPTAHYDRATE 40 MG/ML
2 INJECTION, SOLUTION INTRAVENOUS ONCE
Status: DISCONTINUED | OUTPATIENT
Start: 2019-01-01 | End: 2019-01-01

## 2019-01-01 RX ORDER — SCOLOPAMINE TRANSDERMAL SYSTEM 1 MG/1
1 PATCH, EXTENDED RELEASE TRANSDERMAL
Status: CANCELLED | OUTPATIENT
Start: 2019-01-01

## 2019-01-01 RX ORDER — SODIUM CHLORIDE, SODIUM LACTATE, POTASSIUM CHLORIDE, CALCIUM CHLORIDE 600; 310; 30; 20 MG/100ML; MG/100ML; MG/100ML; MG/100ML
100 INJECTION, SOLUTION INTRAVENOUS CONTINUOUS
Status: DISCONTINUED | OUTPATIENT
Start: 2019-01-01 | End: 2019-01-01

## 2019-01-01 RX ORDER — SODIUM CHLORIDE 9 MG/ML
150 INJECTION, SOLUTION INTRAVENOUS CONTINUOUS
Status: DISCONTINUED | OUTPATIENT
Start: 2019-01-01 | End: 2019-01-01

## 2019-01-01 RX ORDER — ONDANSETRON 2 MG/ML
4 INJECTION INTRAMUSCULAR; INTRAVENOUS EVERY 6 HOURS PRN
Status: DISCONTINUED | OUTPATIENT
Start: 2019-01-01 | End: 2019-01-01 | Stop reason: HOSPADM

## 2019-01-01 RX ORDER — ACETAMINOPHEN 650 MG/1
650 SUPPOSITORY RECTAL EVERY 4 HOURS PRN
Status: DISCONTINUED | OUTPATIENT
Start: 2019-01-01 | End: 2019-01-01 | Stop reason: HOSPADM

## 2019-01-01 RX ORDER — LORAZEPAM 2 MG/ML
0.5 CONCENTRATE ORAL EVERY 8 HOURS
Status: DISCONTINUED | OUTPATIENT
Start: 2019-01-01 | End: 2019-01-01

## 2019-01-01 RX ORDER — MORPHINE SULFATE 20 MG/ML
10 SOLUTION ORAL
Status: DISCONTINUED | OUTPATIENT
Start: 2019-01-01 | End: 2019-01-01 | Stop reason: HOSPADM

## 2019-01-01 RX ORDER — MORPHINE SULFATE 20 MG/ML
5 SOLUTION ORAL
Status: DISCONTINUED | OUTPATIENT
Start: 2019-01-01 | End: 2019-01-01

## 2019-01-01 RX ORDER — ATROPINE SULFATE 10 MG/ML
2 SOLUTION/ DROPS OPHTHALMIC 4 TIMES DAILY PRN
Status: DISCONTINUED | OUTPATIENT
Start: 2019-01-01 | End: 2019-01-01 | Stop reason: HOSPADM

## 2019-01-01 RX ORDER — LORAZEPAM 2 MG/ML
0.5 CONCENTRATE ORAL EVERY 4 HOURS PRN
Status: DISCONTINUED | OUTPATIENT
Start: 2019-01-01 | End: 2019-01-01

## 2019-01-01 RX ORDER — SODIUM CHLORIDE 0.9 % (FLUSH) 0.9 %
3-10 SYRINGE (ML) INJECTION AS NEEDED
Status: DISCONTINUED | OUTPATIENT
Start: 2019-01-01 | End: 2019-01-01 | Stop reason: HOSPADM

## 2019-01-01 RX ORDER — SCOLOPAMINE TRANSDERMAL SYSTEM 1 MG/1
1 PATCH, EXTENDED RELEASE TRANSDERMAL
Status: DISCONTINUED | OUTPATIENT
Start: 2019-01-01 | End: 2019-01-01 | Stop reason: HOSPADM

## 2019-01-01 RX ORDER — DEXTROSE MONOHYDRATE 25 G/50ML
INJECTION, SOLUTION INTRAVENOUS
Status: DISCONTINUED
Start: 2019-01-01 | End: 2019-01-01 | Stop reason: WASHOUT

## 2019-01-01 RX ORDER — DEXTROSE MONOHYDRATE 25 G/50ML
25 INJECTION, SOLUTION INTRAVENOUS
Status: DISCONTINUED | OUTPATIENT
Start: 2019-01-01 | End: 2019-01-01

## 2019-01-01 RX ORDER — POTASSIUM CHLORIDE 750 MG/1
40 CAPSULE, EXTENDED RELEASE ORAL AS NEEDED
Status: DISCONTINUED | OUTPATIENT
Start: 2019-01-01 | End: 2019-01-01

## 2019-01-01 RX ORDER — IPRATROPIUM BROMIDE AND ALBUTEROL SULFATE 2.5; .5 MG/3ML; MG/3ML
3 SOLUTION RESPIRATORY (INHALATION) ONCE
Status: COMPLETED | OUTPATIENT
Start: 2019-01-01 | End: 2019-01-01

## 2019-01-01 RX ORDER — POTASSIUM CHLORIDE 7.45 MG/ML
10 INJECTION INTRAVENOUS
Status: DISCONTINUED | OUTPATIENT
Start: 2019-01-01 | End: 2019-01-01

## 2019-01-01 RX ORDER — NICOTINE POLACRILEX 4 MG
15 LOZENGE BUCCAL
Status: DISCONTINUED | OUTPATIENT
Start: 2019-01-01 | End: 2019-01-01

## 2019-01-01 RX ADMIN — SODIUM CHLORIDE, PRESERVATIVE FREE 3 ML: 5 INJECTION INTRAVENOUS at 08:05

## 2019-01-01 RX ADMIN — PROPOFOL 10 MCG/KG/MIN: 10 INJECTION, EMULSION INTRAVENOUS at 05:12

## 2019-01-01 RX ADMIN — POTASSIUM PHOSPHATE, MONOBASIC AND POTASSIUM PHOSPHATE, DIBASIC 30 MMOL: 224; 236 INJECTION, SOLUTION INTRAVENOUS at 09:30

## 2019-01-01 RX ADMIN — SODIUM CHLORIDE 1000 ML: 9 INJECTION, SOLUTION INTRAVENOUS at 15:37

## 2019-01-01 RX ADMIN — MORPHINE SULFATE 5 MG: 20 SOLUTION ORAL at 17:45

## 2019-01-01 RX ADMIN — MORPHINE SULFATE 5 MG: 20 SOLUTION ORAL at 03:58

## 2019-01-01 RX ADMIN — LIDOCAINE HYDROCHLORIDE 1 ML: 10 INJECTION, SOLUTION EPIDURAL; INFILTRATION; INTRACAUDAL; PERINEURAL at 12:40

## 2019-01-01 RX ADMIN — NOREPINEPHRINE BITARTRATE 0.16 MCG/KG/MIN: 1 INJECTION INTRAVENOUS at 20:47

## 2019-01-01 RX ADMIN — MORPHINE SULFATE 5 MG: 20 SOLUTION ORAL at 23:02

## 2019-01-01 RX ADMIN — MIDAZOLAM HYDROCHLORIDE 1 MG/HR: 5 INJECTION, SOLUTION INTRAMUSCULAR; INTRAVENOUS at 15:37

## 2019-01-01 RX ADMIN — SODIUM CHLORIDE 1000 ML: 9 INJECTION, SOLUTION INTRAVENOUS at 09:45

## 2019-01-01 RX ADMIN — LORAZEPAM 0.5 MG: 2 SOLUTION, CONCENTRATE ORAL at 15:46

## 2019-01-01 RX ADMIN — MORPHINE SULFATE 10 MG: 20 SOLUTION ORAL at 09:25

## 2019-01-01 RX ADMIN — CHLORHEXIDINE GLUCONATE 15 ML: 1.2 RINSE ORAL at 21:55

## 2019-01-01 RX ADMIN — TAZOBACTAM SODIUM AND PIPERACILLIN SODIUM 3.38 G: 375; 3 INJECTION, SOLUTION INTRAVENOUS at 23:59

## 2019-01-01 RX ADMIN — SODIUM BICARBONATE 50 MEQ: 84 INJECTION, SOLUTION INTRAVENOUS at 14:50

## 2019-01-01 RX ADMIN — SODIUM CHLORIDE, PRESERVATIVE FREE 3 ML: 5 INJECTION INTRAVENOUS at 21:07

## 2019-01-01 RX ADMIN — FAMOTIDINE 20 MG: 10 INJECTION, SOLUTION INTRAVENOUS at 08:42

## 2019-01-01 RX ADMIN — TAZOBACTAM SODIUM AND PIPERACILLIN SODIUM 3.38 G: 375; 3 INJECTION, SOLUTION INTRAVENOUS at 01:21

## 2019-01-01 RX ADMIN — TAZOBACTAM SODIUM AND PIPERACILLIN SODIUM 3.38 G: 375; 3 INJECTION, SOLUTION INTRAVENOUS at 16:43

## 2019-01-01 RX ADMIN — POTASSIUM CHLORIDE 10 MEQ: 10 INJECTION, SOLUTION INTRAVENOUS at 06:26

## 2019-01-01 RX ADMIN — FAMOTIDINE 20 MG: 10 INJECTION, SOLUTION INTRAVENOUS at 17:50

## 2019-01-01 RX ADMIN — PERFLUTREN: 6.52 INJECTION, SUSPENSION INTRAVENOUS at 10:27

## 2019-01-01 RX ADMIN — SODIUM CHLORIDE, POTASSIUM CHLORIDE, SODIUM LACTATE AND CALCIUM CHLORIDE 100 ML/HR: 600; 310; 30; 20 INJECTION, SOLUTION INTRAVENOUS at 16:46

## 2019-01-01 RX ADMIN — ENOXAPARIN SODIUM 30 MG: 30 INJECTION SUBCUTANEOUS at 15:38

## 2019-01-01 RX ADMIN — POTASSIUM CHLORIDE 10 MEQ: 10 INJECTION, SOLUTION INTRAVENOUS at 03:36

## 2019-01-01 RX ADMIN — SODIUM CHLORIDE, PRESERVATIVE FREE 3 ML: 5 INJECTION INTRAVENOUS at 10:16

## 2019-01-01 RX ADMIN — MINERAL OIL AND WHITE PETROLATUM: 150; 830 OINTMENT OPHTHALMIC at 14:44

## 2019-01-01 RX ADMIN — SODIUM CHLORIDE, PRESERVATIVE FREE 3 ML: 5 INJECTION INTRAVENOUS at 21:49

## 2019-01-01 RX ADMIN — POTASSIUM CHLORIDE 10 MEQ: 10 INJECTION, SOLUTION INTRAVENOUS at 10:30

## 2019-01-01 RX ADMIN — NOREPINEPHRINE BITARTRATE 0.22 MCG/KG/MIN: 1 INJECTION INTRAVENOUS at 06:53

## 2019-01-01 RX ADMIN — PROPOFOL 5 MCG/KG/MIN: 10 INJECTION, EMULSION INTRAVENOUS at 17:46

## 2019-01-01 RX ADMIN — POTASSIUM CHLORIDE 10 MEQ: 10 INJECTION, SOLUTION INTRAVENOUS at 09:30

## 2019-01-01 RX ADMIN — SODIUM BICARBONATE 200 ML/HR: 84 INJECTION, SOLUTION INTRAVENOUS at 01:29

## 2019-01-01 RX ADMIN — SODIUM CHLORIDE 1560 ML: 9 INJECTION, SOLUTION INTRAVENOUS at 16:17

## 2019-01-01 RX ADMIN — MORPHINE SULFATE 10 MG: 20 SOLUTION ORAL at 12:36

## 2019-01-01 RX ADMIN — CHLORHEXIDINE GLUCONATE 15 ML: 1.2 RINSE ORAL at 08:42

## 2019-01-01 RX ADMIN — POTASSIUM CHLORIDE 10 MEQ: 10 INJECTION, SOLUTION INTRAVENOUS at 07:59

## 2019-01-01 RX ADMIN — SCOPALAMINE 1 PATCH: 1 PATCH, EXTENDED RELEASE TRANSDERMAL at 06:35

## 2019-01-01 RX ADMIN — SODIUM CHLORIDE 100 ML/HR: 9 INJECTION, SOLUTION INTRAVENOUS at 11:24

## 2019-01-01 RX ADMIN — LORAZEPAM 0.5 MG: 2 SOLUTION, CONCENTRATE ORAL at 03:58

## 2019-01-01 RX ADMIN — POTASSIUM CHLORIDE 10 MEQ: 10 INJECTION, SOLUTION INTRAVENOUS at 17:22

## 2019-01-01 RX ADMIN — SODIUM CHLORIDE, POTASSIUM CHLORIDE, SODIUM LACTATE AND CALCIUM CHLORIDE 100 ML/HR: 600; 310; 30; 20 INJECTION, SOLUTION INTRAVENOUS at 08:42

## 2019-01-01 RX ADMIN — PIPERACILLIN SODIUM AND TAZOBACTAM SODIUM 3.38 G: 3; .375 INJECTION, POWDER, LYOPHILIZED, FOR SOLUTION INTRAVENOUS at 15:42

## 2019-01-01 RX ADMIN — FAMOTIDINE 20 MG: 10 INJECTION, SOLUTION INTRAVENOUS at 08:23

## 2019-01-01 RX ADMIN — TAZOBACTAM SODIUM AND PIPERACILLIN SODIUM 3.38 G: 375; 3 INJECTION, SOLUTION INTRAVENOUS at 08:42

## 2019-01-01 RX ADMIN — POTASSIUM CHLORIDE 10 MEQ: 10 INJECTION, SOLUTION INTRAVENOUS at 05:05

## 2019-01-01 RX ADMIN — SODIUM CHLORIDE, PRESERVATIVE FREE 3 ML: 5 INJECTION INTRAVENOUS at 21:36

## 2019-01-01 RX ADMIN — MINERAL OIL AND WHITE PETROLATUM: 150; 830 OINTMENT OPHTHALMIC at 09:25

## 2019-01-01 RX ADMIN — ENOXAPARIN SODIUM 30 MG: 30 INJECTION SUBCUTANEOUS at 17:46

## 2019-01-01 RX ADMIN — MORPHINE SULFATE 10 MG: 20 SOLUTION ORAL at 14:43

## 2019-01-01 RX ADMIN — LORAZEPAM 0.5 MG: 2 SOLUTION, CONCENTRATE ORAL at 08:37

## 2019-01-01 RX ADMIN — VANCOMYCIN HYDROCHLORIDE 1000 MG: 1 INJECTION, POWDER, LYOPHILIZED, FOR SOLUTION INTRAVENOUS at 15:44

## 2019-01-01 RX ADMIN — LORAZEPAM 0.5 MG: 2 SOLUTION, CONCENTRATE ORAL at 23:02

## 2019-01-01 RX ADMIN — CHLORHEXIDINE GLUCONATE 15 ML: 1.2 RINSE ORAL at 21:36

## 2019-01-01 RX ADMIN — SODIUM CHLORIDE, POTASSIUM CHLORIDE, SODIUM LACTATE AND CALCIUM CHLORIDE 150 ML/HR: 600; 310; 30; 20 INJECTION, SOLUTION INTRAVENOUS at 17:46

## 2019-01-01 RX ADMIN — LORAZEPAM 0.5 MG: 2 SOLUTION, CONCENTRATE ORAL at 06:56

## 2019-01-01 RX ADMIN — POTASSIUM CHLORIDE 10 MEQ: 10 INJECTION, SOLUTION INTRAVENOUS at 18:31

## 2019-01-01 RX ADMIN — SODIUM CHLORIDE 1000 ML: 9 INJECTION, SOLUTION INTRAVENOUS at 14:36

## 2019-01-01 RX ADMIN — MORPHINE SULFATE 5 MG: 20 SOLUTION ORAL at 14:17

## 2019-01-01 RX ADMIN — DEXTROSE MONOHYDRATE 25 G: 25 INJECTION, SOLUTION INTRAVENOUS at 11:34

## 2019-01-01 RX ADMIN — MAGNESIUM SULFATE HEPTAHYDRATE 2 G: 40 INJECTION, SOLUTION INTRAVENOUS at 09:30

## 2019-01-01 RX ADMIN — IPRATROPIUM BROMIDE AND ALBUTEROL SULFATE 3 ML: 2.5; .5 SOLUTION RESPIRATORY (INHALATION) at 15:10

## 2019-01-01 RX ADMIN — SODIUM BICARBONATE 200 ML/HR: 84 INJECTION, SOLUTION INTRAVENOUS at 20:02

## 2019-01-01 RX ADMIN — ATROPINE SULFATE 2 DROP: 10 SOLUTION/ DROPS OPHTHALMIC at 10:16

## 2019-01-01 RX ADMIN — CEFTRIAXONE SODIUM 2 G: 2 INJECTION, POWDER, FOR SOLUTION INTRAMUSCULAR; INTRAVENOUS at 11:47

## 2019-01-01 RX ADMIN — SODIUM CHLORIDE, PRESERVATIVE FREE 3 ML: 5 INJECTION INTRAVENOUS at 08:42

## 2019-01-01 RX ADMIN — LORAZEPAM 1 MG: 2 SOLUTION, CONCENTRATE ORAL at 12:36

## 2019-01-01 RX ADMIN — SODIUM CHLORIDE 1000 ML: 9 INJECTION, SOLUTION INTRAVENOUS at 14:58

## 2019-01-01 RX ADMIN — TAZOBACTAM SODIUM AND PIPERACILLIN SODIUM 3.38 G: 375; 3 INJECTION, SOLUTION INTRAVENOUS at 07:57

## 2019-01-01 RX ADMIN — SODIUM CHLORIDE, PRESERVATIVE FREE 3 ML: 5 INJECTION INTRAVENOUS at 21:55

## 2019-01-01 RX ADMIN — SODIUM CHLORIDE, POTASSIUM CHLORIDE, SODIUM LACTATE AND CALCIUM CHLORIDE 100 ML/HR: 600; 310; 30; 20 INJECTION, SOLUTION INTRAVENOUS at 02:40

## 2019-01-01 RX ADMIN — POTASSIUM CHLORIDE 20 MEQ: 14.9 INJECTION, SOLUTION INTRAVENOUS at 07:01

## 2019-01-01 RX ADMIN — Medication 50 MEQ: at 17:50

## 2019-01-01 RX ADMIN — NOREPINEPHRINE BITARTRATE 0.02 MCG/KG/MIN: 1 INJECTION INTRAVENOUS at 09:20

## 2019-01-01 RX ADMIN — POTASSIUM CHLORIDE 10 MEQ: 10 INJECTION, SOLUTION INTRAVENOUS at 16:49

## 2019-01-01 RX ADMIN — NOREPINEPHRINE BITARTRATE 0.02 MCG/KG/MIN: 1 INJECTION INTRAVENOUS at 15:00

## 2019-01-01 RX ADMIN — MORPHINE SULFATE 5 MG: 20 SOLUTION ORAL at 06:35

## 2019-01-01 RX ADMIN — MORPHINE SULFATE 5 MG: 20 SOLUTION ORAL at 15:22

## 2019-01-01 RX ADMIN — LORAZEPAM 0.5 MG: 2 SOLUTION, CONCENTRATE ORAL at 23:27

## 2019-01-01 RX ADMIN — VANCOMYCIN HYDROCHLORIDE 750 MG: 10 INJECTION, POWDER, LYOPHILIZED, FOR SOLUTION INTRAVENOUS at 15:38

## 2019-01-01 RX ADMIN — SODIUM CHLORIDE, PRESERVATIVE FREE 3 ML: 5 INJECTION INTRAVENOUS at 08:36

## 2019-01-01 RX ADMIN — LORAZEPAM 0.5 MG: 2 SOLUTION, CONCENTRATE ORAL at 15:45

## 2019-01-01 RX ADMIN — MINERAL OIL AND WHITE PETROLATUM 1 APPLICATION: 150; 830 OINTMENT OPHTHALMIC at 17:44

## 2019-02-02 PROBLEM — A41.9 SHOCK, SEPTIC (HCC): Status: ACTIVE | Noted: 2019-01-01

## 2019-02-02 PROBLEM — R00.0 SINUS TACHYCARDIA: Status: ACTIVE | Noted: 2019-01-01

## 2019-02-02 PROBLEM — R77.8 ELEVATED TROPONIN: Status: ACTIVE | Noted: 2019-01-01

## 2019-02-02 PROBLEM — D72.829 LEUKOCYTOSIS: Status: ACTIVE | Noted: 2019-01-01

## 2019-02-02 PROBLEM — R65.20 SEVERE SEPSIS (HCC): Status: ACTIVE | Noted: 2019-01-01

## 2019-02-02 PROBLEM — E87.0 HYPERNATREMIA: Status: ACTIVE | Noted: 2019-01-01

## 2019-02-02 PROBLEM — E87.20 LACTIC ACIDOSIS: Status: ACTIVE | Noted: 2019-01-01

## 2019-02-02 PROBLEM — J96.01 ACUTE RESPIRATORY FAILURE WITH HYPOXIA (HCC): Status: ACTIVE | Noted: 2019-01-01

## 2019-02-02 PROBLEM — N39.0 ACUTE UTI (URINARY TRACT INFECTION): Status: ACTIVE | Noted: 2019-01-01

## 2019-02-02 PROBLEM — N17.9 ACUTE RENAL FAILURE (ARF) (HCC): Status: ACTIVE | Noted: 2019-01-01

## 2019-02-02 PROBLEM — J18.9 BILATERAL PNEUMONIA: Status: ACTIVE | Noted: 2019-01-01

## 2019-02-02 PROBLEM — A41.9 SEVERE SEPSIS (HCC): Status: ACTIVE | Noted: 2019-01-01

## 2019-02-02 PROBLEM — R65.21 SHOCK, SEPTIC (HCC): Status: ACTIVE | Noted: 2019-01-01

## 2019-02-02 NOTE — ED NOTES
Central line placement per Dr. Carias. Assist of YOEL Wong and Tram Dhaliwal RN RN  02/02/19 [id number]

## 2019-02-02 NOTE — PROGRESS NOTES
Discharge Planning Assessment  [facility]     Patient Name: Jb Verde  MRN: [id number]  Today's Date: 2/2/2019    Admit Date: 2/2/2019    Discharge Needs Assessment    No documentation.       Discharge Plan     Row Name 02/02/19 1631       Plan    Plan  Notified Leslie Martin at 1620 that pt was getting admitted/ no stable for transfer/ vent and Levophed.  Faxed clinical to him.  Samantha Cortez RN [facility]    Patient/Family in Agreement with Plan  yes                Samantha Cortez, RN

## 2019-02-02 NOTE — ED NOTES
Daughter at bedside states that over the past two days she has noticed that a decreased in patients responsiveness. With decreased appetite.  Daughter state that around 0900 she noticed that patient had labored/ grunting respirations so she contacted ems     Tram Diaz RN  02/02/19 [id number]       Tram Diaz RN  02/02/19 [id number]

## 2019-02-02 NOTE — H&P
[facility] Medicine Services  HISTORY AND PHYSICAL    Date of Admission: 2/2/2019  Primary Care Physician: Tamiko Vizcaino MD    Subjective     Chief Complaint: shortness of breath, respiratory failure    History of Present Illness  This is an 84-year-old  gentleman who presents by EMS from his home.  He lives in [place], [place].  He was found to be in severe respiratory distress once they arrived at his home.  He required intubation there.  He was tachycardic and hypotensive in the field.  He received fluids and Levophed.    In the emergency department, he has had a central line placed.  He has been bolused considerable fluid and has also been given sodium bicarbonate.  He remains with a severe acidosis.    His daughter provides the history.  She tells me that he was admitted a few weeks ago to [facility] in [place], [place].  He was apparently treated for pneumonia at that point in time.  He was then discharged for rehab at [facility] and did not do very well.  She ultimately elected to take him home with home health and he has been back at home with her for around 10-12 days.    She states that over the last couple days he has been more lethargic.  He has seemed more short of breath.  He has not been eating well.  She states that she believes him to also have some difficulty with swallowing and does not recall them checking this out when he was in the hospital recently.  She is unaware of any fevers, sweats, or chills.  She denies that he has been having problems with nausea or vomiting or loose stool.  She states that he has not been complaining of any pain.    He became more acutely short of breath this morning causing her to request EMS.    Review of Systems   Patient unable to provide given intubated and sedated state.  Discussed with family.    Past Medical History:   Past Medical History:   Diagnosis Date   • Acid reflux    • Allergic  rhinitis    • Arthritis    • Monique esophagus    • Carotid atherosclerosis    • Cognitive impairment    • Delirium    • Folic acid deficiency    • Hyperlipemia    • Hypertension    • Kidney stone    • Nail dystrophy    • Osteoarthritis of knee    • Peripheral vascular disease (CMS/HCC)    • Polyp of colon    • Transient cerebral ischemia    • Traumatic amputation of thumb    • Urinary tract infection    • Vitamin B12 deficiency    • Vitamin D deficiency      Past Surgical History:  Past Surgical History:   Procedure Laterality Date   • CYSTOSCOPY URETEROSCOPY LASER LITHOTRIPSY Left 12/5/2016    Procedure: CYSTOSCOPY URETEROSCOPY LASER LITHOTRIPSY;  Surgeon: Rolf Stanley MD;  Location:  [facility] OR;  Service:    • CYSTOSCOPY W/ URETERAL STENT PLACEMENT Left 11/16/2016    Procedure: CYSTOSCOPY WITH LEFT DOUBLE J STENT INSERTION WITH LEFT EXTRACORPOREAL SHOCKWAVE LITHOTRIPSY;  Surgeon: Rolf Stanley MD;  Location:  [facility] OR;  Service:    • GALLBLADDER SURGERY     • KIDNEY STONE SURGERY     • OTHER SURGICAL HISTORY Right 1958    R/T TRAUMATIC INJURY AT WORK      Social History:  reports that he has quit smoking. he has never used smokeless tobacco. He reports that he does not drink alcohol or use drugs.    Family History: family history includes Breast cancer in his mother; Colon cancer in his maternal aunt.       Allergies:  Allergies   Allergen Reactions   • Aspirin      Other reaction(s): GI BLEEDING     Medications:  Prior to Admission medications    Medication Sig Start Date End Date Taking? Authorizing Provider   atorvastatin (LIPITOR) 80 MG tablet Take 80 mg by mouth Daily.    Provider, MD Jas   capsicum (ZOSTRIX) 0.075 % topical cream Apply 1 application topically 4 (Four) Times a Day. TO KNEE JOINTS FOR PAIN    Provider, MD Jas   carboxymethylcellulose (REFRESH PLUS) 0.5 % solution Administer 1 drop to both eyes 4 (Four) Times a Day As Needed for dry eyes (AS NEEDED DRY EYES).     "Jas Gorman MD   Cholecalciferol (VITAMIN D PO) Take 2,000 Units by mouth Daily.    Jas Gorman MD   docusate sodium (COLACE) 100 MG capsule Take 100 mg by mouth 2 (Two) Times a Day.    Jas Gorman MD   finasteride (PROSCAR) 5 MG tablet Take 5 mg by mouth Daily.    Jas Gorman MD   galantamine ER (RAZADYNE ER) 8 MG 24 hr capsule Take 8 mg by mouth Daily With Breakfast.    Jas Gorman MD   HYDROcodone-acetaminophen (NORCO)  MG per tablet Take 1 tablet by mouth 3 (Three) Times a Day As Needed for moderate pain (4-6). AS NEEDED FOR PAIN 12/5/16   Rolf Stanley MD   ibuprofen (ADVIL,MOTRIN) 600 MG tablet Take 600 mg by mouth 3 (Three) Times a Day.    Jas Gorman MD   loratadine (CLARITIN) 10 MG tablet Take 10 mg by mouth Daily.    Jas Gorman MD   mirtazapine (REMERON) 30 MG tablet Take 30 mg by mouth Every Night. TAKES 1/2 TAB    Jas Gorman MD   Multiple Vitamins-Minerals (MULTIVITAMIN ADULT PO) Take 1 tablet by mouth Daily.    Jas Gorman MD   polyethylene glycol (MIRALAX) packet Take 17 g by mouth Daily.    Jas Gorman MD   potassium chloride (KLOR-CON) 20 MEQ packet Take 20 mEq by mouth Daily. PT TAKING 1/2 20 MEQ TABLET    Jas Gorman MD   senna (SENOKOT) 8.6 MG tablet Take 1 tablet by mouth 2 (Two) Times a Day.    Jas Gorman MD   sertraline (ZOLOFT) 50 MG tablet Take 50 mg by mouth Daily. TAKES 1/2 TAB    Jas Gorman MD   tamsulosin (FLOMAX) 0.4 MG capsule 24 hr capsule Take 0.4 mg by mouth Daily. TAKES 2 CAPSULES    Jas Gorman MD   dexlansoprazole (DEXILANT) 60 MG capsule Take 60 mg by mouth Daily.  2/2/19  Jas Gorman MD     Objective     Vital Signs: BP 91/63   Pulse 113   Temp 96 °F (35.6 °C) (Rectal)   Resp 20   Ht 182.9 cm (72\")   Wt 52 kg (114 lb 11.2 oz)   SpO2 100%   BMI 15.56 kg/m²   Physical Exam   Constitutional:   Acutely " "ill-appearing 84-year-old  gentleman currently on ventilatory support and sedated.  He is underweight and appears cachectic.  His daughter and 1 of his friends are present with him.  Dr. Carias and I saw the patient together.  Seen and discussed with his nurse, Katherine.   HENT:   Head: Normocephalic and atraumatic.   Eyes: Conjunctivae are normal. Pupils are equal, round, and reactive to light.   Neck: Neck supple. No JVD present.   Endotracheal tube secure.   Cardiovascular: Normal rate, regular rhythm, normal heart sounds and intact distal pulses. Exam reveals no gallop and no friction rub.   No murmur heard.  Pulmonary/Chest:   Ventilated, coarse throughout.   Abdominal: Soft. Bowel sounds are normal. He exhibits no distension. There is no tenderness. There is no rebound.   Genitourinary:   Genitourinary Comments: We will Place Perez catheter.   Musculoskeletal: Normal range of motion. He exhibits no edema, tenderness or deformity.   Right femoral central venous catheter placed by Dr. Carias in the emergency department.  He has some drainage that is serous coming from his right upper extremity where he was bitten by dog a few months ago.  His daughter has been \"doctoring it.\"  This does not appear cellulitic and I do not see an abscess.   Neurological: He displays normal reflexes. No cranial nerve deficit. He exhibits normal muscle tone.   Sedate.   Skin: Skin is dry. Capillary refill takes 2 to 3 seconds. No rash noted. There is pallor.   Mottling, cool. Looks hypovolemic.     Results Reviewed:  Lab Results (last 24 hours)     Procedure Component Value Units Date/Time    Blood Gas, Arterial [[id number]]  (Abnormal) Collected:  02/02/19 1625    Specimen:  Arterial Blood Updated:  02/02/19 1634     Site Left Brachial     Amarjit's Test N/A     pH, Arterial 6.970 pH units      Comment: 85 Value below critical limit        pCO2, Arterial 28.7 mm Hg      Comment: 84 Value below reference range        pO2, Arterial " 52.8 mm Hg      Comment: 85 Value below critical limit        HCO3, Arterial 6.6 mmol/L      Comment: 84 Value below reference range        Base Excess, Arterial -22.8 mmol/L      Comment: 84 Value below reference range        O2 Saturation, Arterial 67.9 %      Comment: 84 Value below reference range        Temperature 37.0 C      Barometric Pressure for Blood Gas 753 mmHg      Modality Ventilator     FIO2 100 %      Ventilator Mode AC     Set Tidal Volume 440     Set Mech Resp Rate 20.0     PEEP 5.0     Notified Who dr vera     Notified By [id number]     Notified Time 02/02/2019 16:37     Collected by [id number]     Comment: Meter: [id number]     :  [id number]       Blood Culture - Blood, Leg, Right [[id number]] Collected:  02/02/19 1453    Specimen:  Blood from Leg, Right Updated:  02/02/19 1602    Troponin [[id number]]  (Abnormal) Collected:  02/02/19 1453    Specimen:  Blood Updated:  02/02/19 1556     Troponin I 0.057 ng/mL     Blood Culture - Blood, Wrist, Left [[id number]] Collected:  02/02/19 1453    Specimen:  Blood from Wrist, Left Updated:  02/02/19 1547    Urinalysis With Culture If Indicated - Urine, Catheter In/Out [[id number]]  (Abnormal) Collected:  02/02/19 1509    Specimen:  Urine, Catheter In/Out Updated:  02/02/19 1545     Color, UA Dark Yellow     Appearance, UA Turbid     pH, UA 5.5     Specific Gravity, UA 1.021     Glucose, UA Negative     Ketones, UA Trace     Bilirubin, UA Small (1+)     Blood, UA Moderate (2+)     Protein,  mg/dL (2+)     Leuk Esterase, UA Large (3+)     Nitrite, UA Negative     Urobilinogen, UA 1.0 E.U./dL    Urinalysis, Microscopic Only - Urine, Catheter In/Out [[id number]]  (Abnormal) Collected:  02/02/19 1509    Specimen:  Urine, Catheter In/Out Updated:  02/02/19 1545     RBC, UA 3-5 /HPF      WBC, UA 31-50 /HPF      Bacteria, UA 3+ /HPF      Squamous Epithelial Cells, UA None Seen /HPF      Hyaline Casts, UA None Seen /LPF      Methodology Manual Light  Microscopy    Urine Culture - Urine, Urine, Catheter In/Out [[id number]] Collected:  02/02/19 1509    Specimen:  Urine, Catheter In/Out Updated:  02/02/19 1545    CBC & Differential [[id number]] Collected:  02/02/19 1453    Specimen:  Blood Updated:  02/02/19 1533    Narrative:       The following orders were created for panel order CBC & Differential.  Procedure                               Abnormality         Status                     ---------                               -----------         ------                     Manual Differential[[id number]]          Abnormal            Final result               CBC Auto Differential[[id number]]        Abnormal            Final result                 Please view results for these tests on the individual orders.    CBC Auto Differential [[id number]]  (Abnormal) Collected:  02/02/19 1453    Specimen:  Blood Updated:  02/02/19 1533     WBC 21.92 10*3/mm3      RBC 5.02 10*6/mm3      Hemoglobin 14.5 g/dL      Hematocrit 48.2 %      MCV 96.0 fL      MCH 28.9 pg      MCHC 30.1 g/dL      RDW 15.6 %      RDW-SD 54.2 fl      MPV 11.6 fL      Platelets 267 10*3/mm3     Manual Differential [[id number]]  (Abnormal) Collected:  02/02/19 1453    Specimen:  Blood Updated:  02/02/19 1533     Neutrophil % 66.7 %      Lymphocyte % 18.2 %      Monocyte % 1.0 %      Bands %  14.1 %      Neutrophils Absolute 17.71 10*3/mm3      Lymphocytes Absolute 3.99 10*3/mm3      Monocytes Absolute 0.22 10*3/mm3      Poikilocytes Slight/1+     WBC Morphology Normal     Platelet Morphology Normal    Comprehensive Metabolic Panel [[id number]]  (Abnormal) Collected:  02/02/19 1453    Specimen:  Blood Updated:  02/02/19 1533     Glucose 202 mg/dL      BUN 21 mg/dL      Creatinine 1.57 mg/dL      Sodium 154 mmol/L      Potassium 4.2 mmol/L      Chloride 124 mmol/L      CO2 18.0 mmol/L      Calcium 8.4 mg/dL      Total Protein 6.0 g/dL      Albumin 3.10 g/dL      ALT (SGPT) 52 U/L      AST (SGOT) 39 U/L       Alkaline Phosphatase 85 U/L      Total Bilirubin 1.0 mg/dL      eGFR Non African Amer 42 mL/min/1.73      Globulin 2.9 gm/dL      A/G Ratio 1.1 g/dL      BUN/Creatinine Ratio 13.4     Anion Gap 12.0 mmol/L     Narrative:       The MDRD GFR formula is only valid for adults with stable renal function between ages 18 and 70.    Lactic Acid, Plasma [[id number]]  (Abnormal) Collected:  02/02/19 1453    Specimen:  Blood Updated:  02/02/19 1532     Lactate 8.2 mmol/L     Lactic Acid, Reflex Timer (This will reflex a repeat order 3-3:15 hours after ordered.) [[id number]] Collected:  02/02/19 1453    Specimen:  Blood Updated:  02/02/19 1532    BNP [[id number]]  (Abnormal) Collected:  02/02/19 1453    Specimen:  Blood Updated:  02/02/19 1527     proBNP 1,810.0 pg/mL     Protime-INR [[id number]]  (Abnormal) Collected:  02/02/19 1453    Specimen:  Blood Updated:  02/02/19 1526     Protime 18.6 Seconds      INR 1.50    Blood Gas, Arterial With Co-Ox [[id number]]  (Abnormal) Collected:  02/02/19 1440    Specimen:  Arterial Blood Updated:  02/02/19 1455     Site Left Radial     Amarjit's Test Positive     pH, Arterial 6.986 pH units      Comment: 85 Value below critical limit        pCO2, Arterial 69.9 mm Hg      Comment: 86 Value above critical limit        pO2, Arterial 69.6 mm Hg      Comment: 84 Value below reference range        HCO3, Arterial 16.7 mmol/L      Comment: 84 Value below reference range        Base Excess, Arterial -16.1 mmol/L      Comment: 84 Value below reference range        O2 Saturation, Arterial 80.6 %      Comment: 84 Value below reference range        Hemoglobin, Blood Gas 15.5 g/dL      Hematocrit, Blood Gas 47.5 %      Oxyhemoglobin 79.3 %      Comment: 84 Value below reference range        Methemoglobin 1.00 %      Carboxyhemoglobin 0.6 %      A-a Gradiant -- mmHg      Comment: UNABLE TO CALCULATE        Temperature 37.0 C      Sodium, Arterial 161 mmol/L      Comment: 86 Value above critical limit         Potassium, Arterial 4.2 mmol/L      Barometric Pressure for Blood Gas 754 mmHg      Modality Ventilator     FIO2 100 %      Ventilator Mode AC     Set Tidal Volume 440     Set [facility] 14.0     PEEP 5.0     Note --     Notified Who dr vera     Notified By [id number]     Notified Time 02/02/2019 14:58     Collected by [id number]     Comment: Meter: [id number]     :  [id number]        pH, Temp Corrected -- pH Units      pCO2, Temperature Corrected -- mm Hg      pO2, Temperature Corrected -- mm Hg         Imaging Results (last 24 hours)     Procedure Component Value Units Date/Time    XR Chest 1 View [[id number]] Collected:  02/02/19 1453     Updated:  02/02/19 1458    Narrative:       EXAMINATION:  XR CHEST 1 VW-  2/2/2019 2:49 PM CST     HISTORY: Patient was intubated.     COMPARISON: 12/5/2016.     FINDINGS:  The endotracheal tube tip is at the T4 level. There is  minimal linear infiltrate in the left lung base medially. There is  minimal patchy infiltrate in the right upper and right lower lung  fields. The heart is normal in size.       Impression:       1. Endotracheal tube tip is at the T4 level.  2. Patchy infiltrates bilaterally may represent minimal pneumonia or  atelectasis.        This report was finalized on 02/02/2019 14:55 by Dr. Dexter Ernandez MD.        I have personally reviewed and interpreted the radiology studies and ECG obtained at time of admission.     Assessment / Plan     Assessment:   Active Hospital Problems    Diagnosis   • **Acute respiratory failure with hypoxia (CMS/HCC)   • Severe sepsis (CMS/HCC)   • Shock, septic (CMS/HCC)   • Bilateral pneumonia   • Lactic acidosis   • Leukocytosis   • Sinus tachycardia   • Elevated troponin   • Acute renal failure (ARF) (CMS/HCC)   • Hypernatremia   • Acute UTI (urinary tract infection)     Plan:   The patient presents in extremis secondary to severe sepsis and septic shock from community-acquired pneumonia and a urinary tract infection.   He will be admitted to my service here at [facility] in the intensive care unit.    The ER has ordered a total of 4560 mL's of normal saline to be given thus far.  Levophed has been administered.  Dr. Carias also gave him 2 ampoules of sodium bicarbonate for his severe systemic acidosis.    He has been administered vancomycin and Zosyn.  We will continue Zosyn and ask pharmacy to continue to dose vancomycin.  Blood cultures have been drawn.  Obtain a sputum culture.  Check Legionella and streptococcal urinary antigens.  Swab for influenza.  He has a severe lactic acidosis which will be reassessed after sepsis bolus.    Consult pulmonary to assist in his critical illness and ventilatory support.  Routine vent orders.  Transition sedation to Diprivan rather than Versed.    He does have a slight troponin elevation.  We will trend this at present.  No cardiology consultation or further workup at present.  He may ultimately require an echocardiogram.    Check urine electrolytes.  Baseline renal ultrasound.  He does have a history of nephrolithiasis and has had to see Dr. Stanley for this in the past.    Hold all home oral medications at present.  Nothing on his home medication list is absolutely necessary in his care at this point in time.    Lovenox for DVT prophylaxis.  Pepcid for peptic ulcer prophylaxis while on ventilatory support.    Code Status: Full per his family.      I discussed the patient's findings and my recommendations with the patient's family and Dr. Carias in the ER.     The family has been made aware by myself and Dr. Carias that he has an extremely high risk of mortality at this point in time.  They understand, but still would like for us to exhaust all measures in his care at this point in time.    Estimated length of stay is indeterminate at present.     Mario Tran DO   02/02/19   4:46 PM     Approximately 60 minutes of critical care time were spent managing the patient exclusive of  billable procedures.    Dr. Carias and I just gave him another 2 amps of sodium bicarbonate based on his most recent gas.    Results from last 7 days   Lab Units 02/02/19  1625   PH, ARTERIAL pH units 6.970*   PO2 ART mm Hg 52.8*   PCO2, ARTERIAL mm Hg 28.7*   HCO3 ART mmol/L 6.6*     If he needs any additional fluid, this will need to be in the form of lactated Ringer's given his hypernatremia and how much crystalloid he has already gotten.    Mario Tran,   02/02/19  4:51 PM

## 2019-02-02 NOTE — ED PROVIDER NOTES
Subjective   84-year-old male presenting to the emergency department with presumed breast or distressed.  Family states that for the past 2-3 days patient has not been eating well.  Per family today patient was having difficulty breathing when EMS arrived they found him tripoding and struggling to breathe patient was intubated in the field and was also started on the Levophed because they found his pressures to be in the 60s systolic.  Further history unobtainable due to acuity of condition.            Review of Systems   Unable to perform ROS: Acuity of condition       Past Medical History:   Diagnosis Date   • Acid reflux    • Allergic rhinitis    • Arthritis    • Monique esophagus    • Carotid atherosclerosis    • Cognitive impairment    • Delirium    • Folic acid deficiency    • Hyperlipemia    • Hypertension    • Kidney stone    • Nail dystrophy    • Osteoarthritis of knee    • Peripheral vascular disease (CMS/HCC)    • Polyp of colon    • Transient cerebral ischemia    • Traumatic amputation of thumb    • Urinary tract infection    • Vitamin B12 deficiency    • Vitamin D deficiency        Allergies   Allergen Reactions   • Aspirin      Other reaction(s): GI BLEEDING       Past Surgical History:   Procedure Laterality Date   • CYSTOSCOPY URETEROSCOPY LASER LITHOTRIPSY Left 12/5/2016    Procedure: CYSTOSCOPY URETEROSCOPY LASER LITHOTRIPSY;  Surgeon: Rolf Stanley MD;  Location: [facility] OR;  Service:    • CYSTOSCOPY W/ URETERAL STENT PLACEMENT Left 11/16/2016    Procedure: CYSTOSCOPY WITH LEFT DOUBLE J STENT INSERTION WITH LEFT EXTRACORPOREAL SHOCKWAVE LITHOTRIPSY;  Surgeon: Rolf Stanley MD;  Location: [facility] OR;  Service:    • GALLBLADDER SURGERY     • KIDNEY STONE SURGERY     • OTHER SURGICAL HISTORY Right 1958    R/T TRAUMATIC INJURY AT WORK        Family History   Problem Relation Age of Onset   • Breast cancer Mother    • Colon cancer Maternal Aunt        Social History     Socioeconomic History    • Marital status:      Spouse name: Not on file   • Number of children: Not on file   • Years of education: Not on file   • Highest education level: Not on file   Tobacco Use   • Smoking status: Former Smoker   • Smokeless tobacco: Never Used   • Tobacco comment: 1960   Substance and Sexual Activity   • Alcohol use: No   • Drug use: No           Objective   Physical Exam   Constitutional:   Cachectic appearing gentleman intubated   HENT:   Head: Normocephalic and atraumatic.   Endotracheal tube in the oropharynx with dry mucous membranes   Eyes:   Pupils nonreactive to light and pinpoint   Neck: No JVD present.   Pulmonary/Chest: He has rales.   Patient breathing with ventilator assistance   Abdominal: Soft.   Musculoskeletal:   Range of motion cannot be evaluated however patient noted be moving extremities at times   Neurological:   Patient intubated however GCS appears to be 3T   Skin: Skin is warm. Capillary refill takes 2 to 3 seconds.       Central Line At Bedside  Date/Time: 2/2/2019 3:56 PM  Performed by: Hannah Carias MD  Authorized by: Hannah Carias MD     Consent:     Consent obtained:  Emergent situation    Alternatives discussed:  No treatment  Pre-procedure details:     Hand hygiene: Hand hygiene performed prior to insertion      Skin preparation:  2% chlorhexidine  Anesthesia (see MAR for exact dosages):     Anesthesia method:  Local infiltration  Procedure details:     Location:  R femoral    Patient position:  Flat    Procedural supplies:  Triple lumen    Landmarks identified: yes      Ultrasound guidance: yes      Number of attempts:  1    Successful placement: yes                 ED Course  ED Course as of Feb 02 1559   Sat Feb 02, 2019   1551 Patient will be admitted to the ICU.  Patient is currently intubated which was done in the field by EMS.  The tube was verified by chest x-ray as well as direct visualization with the allergic scope by me.  In terms of the patient's workup and what  is been done for management.  It seems the patient is severely dehydrated.  Patient has already's received in the ED 30 cc/kg of normal saline and has been written for another 30 cc/kg.  Patient has received broad-spectrum antibiotics with a glass and Zosyn.  In regards to the patient's arterial blood gas patient's ventilator has been adjusted to increase his respiratory rate to help decrease his PCO2 and improve his pH.  Patient also received 2 A of sodium bicarbonate.  Patient will be continued worked up as presumed sepsis.  [AP]      ED Course User Index  [AP] Hannah Carias MD                  MDM  Number of Diagnoses or Management Options     Amount and/or Complexity of Data Reviewed  Clinical lab tests: reviewed  Tests in the radiology section of CPT®: reviewed  Tests in the medicine section of CPT®: reviewed  Decide to obtain previous medical records or to obtain history from someone other than the patient: yes    Critical Care  Total time providing critical care: > 105 minutes        Final diagnoses:   Respiratory distress   Sepsis, due to unspecified organism (CMS/HCC)   DEMETRIS (acute kidney injury) (CMS/HCC)   Metabolic acidosis            Hannah Carias MD  02/02/19 1600

## 2019-02-03 PROBLEM — E83.39 HYPOPHOSPHATEMIA: Status: ACTIVE | Noted: 2019-01-01

## 2019-02-03 PROBLEM — E87.6 HYPOKALEMIA: Status: ACTIVE | Noted: 2019-01-01

## 2019-02-03 PROBLEM — E83.42 HYPOMAGNESEMIA: Status: ACTIVE | Noted: 2019-01-01

## 2019-02-03 NOTE — PLAN OF CARE
Problem: Skin Injury Risk (Adult)  Goal: Skin Health and Integrity  Outcome: Ongoing (interventions implemented as appropriate)      Problem: Fall Risk (Adult)  Goal: Absence of Fall  Outcome: Ongoing (interventions implemented as appropriate)      Problem: Wound (Includes Pressure Injury) (Adult)  Goal: Signs and Symptoms of Listed Potential Problems Will be Absent, Minimized or Managed (Wound)  Outcome: Ongoing (interventions implemented as appropriate)      Problem: Ventilation, Mechanical Invasive (Adult)  Goal: Signs and Symptoms of Listed Potential Problems Will be Absent, Minimized or Managed (Ventilation, Mechanical Invasive)  Outcome: Ongoing (interventions implemented as appropriate)      Problem: Patient Care Overview  Goal: Plan of Care Review  Outcome: Ongoing (interventions implemented as appropriate)   02/02/19 1820   Coping/Psychosocial   Plan of Care Reviewed With family   Plan of Care Review   Progress no change   OTHER   Outcome Summary pt admitted from er from [facility]. pt in respiratory distress and intubated per [facility]. pt will withdrawal from pain. levophed at 0.12 bp reamins low. bicarb given. swabbed for flu.      Goal: Individualization and Mutuality  Outcome: Ongoing (interventions implemented as appropriate)    Goal: Discharge Needs Assessment  Outcome: Ongoing (interventions implemented as appropriate)    Goal: Interprofessional Rounds/Family Conf  Outcome: Ongoing (interventions implemented as appropriate)

## 2019-02-03 NOTE — NURSING NOTE
Dr. Flanagan in unit, updated on CT results and lactic trending down. Requested restraints as patient has started moving arms, mainly right arm. Will continue to monitor closely.

## 2019-02-03 NOTE — PLAN OF CARE
Problem: Patient Care Overview  Goal: Plan of Care Review  Outcome: Ongoing (interventions implemented as appropriate)   02/03/19 6161   Coping/Psychosocial   Plan of Care Reviewed With other (see comments)  (Nathalia DIALLO)   OTHER   Outcome Summary SLP consult received, as pt is s/p extubation. Visited pt this PM to attempt bedside swallow eval. Pt with open mouth posture, showing no response to stim for blood draws, taking place in room. Spoke with Nathalia DIALLO, who requested ST hold off at this time, as pt is not yet appropriate for PO intake. Will follow up on 02/04/2019. Thanks!

## 2019-02-03 NOTE — PROGRESS NOTES
[facility]  [phone]  Vasquez Armijo MD    Subjective:  LOS: 1    He is doing better.  He is actually moving some and responding to simple commands.  He remains on low-dose pressors.  His daughter is not at the bedside and states that he has been unable to eat for the last 1 month.  Unclear if this is dysphagia due to a structural problem or more related to his general illness.    Objective   Vital Signs past 24hrs    Temp range: Temp (24hrs), Av.6 °F (36.4 °C), Min:94.9 °F (34.9 °C), Max:99.3 °F (37.4 °C)    BP range: BP: ()/() 110/73  Pulse range: Heart Rate:  [] 103  Resp rate range: Resp:  [20-24] 20    Device (Oxygen Therapy): ventilator   Oxygen range:SpO2:  [79 %-100 %] 100 %   FiO2 (%):  [50 %-100 %] 50 %  S RR:  [12-20] 12  PEEP/CPAP (cm H2O):  [5 cm H20] 5 cm H20  OK SUP:  [0 cm H20-8 cm H20] 8 cm H20  MAP (cm H2O):  [4.3-9.2] 9.2  55.1 kg (121 lb 6.4 oz); Body mass index is 20.84 kg/m².    Intake/Output Summary (Last 24 hours) at 2/3/2019 1107  Last data filed at 2/3/2019 0715  Gross per 24 hour   Intake 3467.6 ml   Output 1025 ml   Net 2442.6 ml       Physical Exam   Constitutional: He appears cachectic. He is intubated.   Eyes:   Unequal pupils   Cardiovascular: Normal rate and regular rhythm.   No murmur heard.  Pulmonary/Chest: He is intubated. He has decreased breath sounds. He has no wheezes. He has no rales.   Abdominal: Soft. Bowel sounds are normal. There is no tenderness.   Musculoskeletal: He exhibits no edema.   Neurological: He is alert.   Nursing note and vitals reviewed.    Results Review:    I have reviewed the laboratory and imaging data since the last note by LPC physician.  My annotations are noted in assessment and plan.    Medication Review:  I have reviewed the current MAR.  My annotations are noted in assessment and plan.      lactated ringers 100 mL/hr Last Rate: 100 mL/hr (19 0842)   norepinephrine 0.02-0.3 mcg/kg/min Last Rate: 0.22  mcg/kg/min (02/03/19 0653)   propofol 5-50 mcg/kg/min Last Rate: 10 mcg/kg/min (02/03/19 0512)     Plan   PCCM Problems  Severe metabolic acidosis  Acute respiratory failure, vent  Shock, possibly septic  Acute kidney injury  Severe dehydration  Severe protein calorie malnutrition  Acute hypokalemia  Hypernatremia and hyperchloremia    Plan of Treatment  Metabolic acidosis improving.  Lactate last check was still high.  No evidence bowel ischemia on the CT of the abdomen.  Minimal bibasilar pneumonia.  Will switch off the bicarbonate drip.  We will continue to trend his lactate.    He actually looks pretty good on the ventilator.  We'll check weaning parameters and see if he is able to extubate.    Shock requiring pressors.  Continue to wean as tolerated.  Unclear if all septic.  Note increase in troponin.  Cardiac injury is not ruled out.    Minimal bibasilar pneumonia on the CT of the abdomen on the cuts of the lung.  Currently on Zosyn and vancomycin.  Check pro-calcitonin level.    Renal function is better.  Patient is making urine.  Suggest adjusting fluids to 1/2NS, as persistent hypernatremia and hyperchloremia.  Replace potassium per protocols.  Replace phosphorus.  Given 2 g of magnesium. BMP and Mag at 4pm.    Requires disimpaction and further bowel regimen.    Needs speech eval.    I spent +35 mins critical care time in care of this patient outside of any procedures.    Vasquez Armijo MD  02/03/19  11:07 AM    Part of this note may be an electronic transcription/translation of spoken language to printed text using the Dragon Dictation System.

## 2019-02-03 NOTE — PROGRESS NOTES
"Pharmacy Dosing Service  Pharmacokinetics  Vancomycin Follow-up Evaluation    Assessment/Action/Plan:  Pharmacy to dose vancomycin for pneumonia, sepsis x 5 days.   Empirically started last night, initial load dose of 1000 mg IV once (~16:00), followed by 500 mg IV Q24H.   Noted improvement in creatinine today, see below (1.57 --> 1.15)  Will adjust empiric dose to 750 mg IV Q24H starting today at 1600.  Pharmacy will continue to monitor renal function and adjust dose accordingly.     Subjective:  Jb Verde is a 84 y.o. male currently on Vancomycin 500 mg IV every 24 hours for the treatment of pneumonia, sepsis, day 2 of therapy.    Objective:  Ht: 162.6 cm (64\"); Wt: 55.1 kg (121 lb 6.4 oz)  Estimated Creatinine Clearance: 37.3 mL/min (by C-G formula based on SCr of 1.15 mg/dL).   Lab Results   Component Value Date    CREATININE 1.15 02/03/2019    CREATININE 1.57 (H) 02/02/2019    CREATININE 0.88 12/05/2016      Lab Results   Component Value Date    WBC 9.61 02/03/2019    WBC 8.28 02/03/2019    WBC 21.92 (H) 02/02/2019       No results found for: VANCOPEAK, VANCOTROUGH, VANCORANDOM    Culture Results:  Microbiology Results (last 10 days)       Procedure Component Value - Date/Time    S. Pneumo Ag Urine or CSF - Urine, Urine, Catheter [[id number]]  (Normal) Collected:  02/02/19 1803    Lab Status:  Final result Specimen:  Urine, Catheter Updated:  02/02/19 1842     Strep Pneumo Ag Negative    Legionella Antigen, Urine - Urine, Urine, Catheter [[id number]]  (Normal) Collected:  02/02/19 1803    Lab Status:  Final result Specimen:  Urine, Catheter Updated:  02/02/19 1842     LEGIONELLA ANTIGEN, URINE Negative    Influenza Antigen, Rapid - Swab, Nasopharynx [[id number]]  (Normal) Collected:  02/02/19 1757    Lab Status:  Final result Specimen:  Swab from Nasopharynx Updated:  02/02/19 1834     Influenza A Ag, EIA Negative     Influenza B Ag, EIA Negative    Narrative:       Recommend confirmation of negative results " by viral culture or molecular assay.    Blood Culture - Blood, Leg, Right [[id number]] Collected:  02/02/19 1453    Lab Status:  Preliminary result Specimen:  Blood from Leg, Right Updated:  02/03/19 0415     Blood Culture No growth at less than 24 hours    Blood Culture - Blood, Wrist, Left [[id number]] Collected:  02/02/19 1453    Lab Status:  Preliminary result Specimen:  Blood from Wrist, Left Updated:  02/03/19 0400     Blood Culture No growth at less than 24 hours            Donis Roque, PharmD   02/03/19 8:10 AM

## 2019-02-03 NOTE — NURSING NOTE
Spoke with patients daughter about risk for travelling for CT with the patients current condition, she would still like us to get the CT of the abdomen per order.

## 2019-02-03 NOTE — CONSULTS
[facility]  Phone: [phone]  Vasquez Armijo MD      Subjective   LOS: 0 days     Thank you for this consultation.  84-year-old male who was at an outlying facility quite recently.  He was sent from there to rehabilitation.  He was there for a short while before he was taken home by the family due to apparent poor quality care.  At home he has been bedbound.  He has underlying dementia.  History regarding chronic medical issues is lacking his family is not here.  There is evidence for peripheral vascular disease and allergic rhinitis.  Other details are unavailable.  Apparently he used to smoke at some point in the past.    Jb Verde  reports that he does not drink alcohol.,  reports that he has quit smoking. he has never used smokeless tobacco.     Past Hx:  has a past medical history of Acid reflux, Allergic rhinitis, Arthritis, Monique esophagus, Carotid atherosclerosis, Cognitive impairment, Delirium, Folic acid deficiency, Hyperlipemia, Hypertension, Kidney stone, Nail dystrophy, Osteoarthritis of knee, Peripheral vascular disease (CMS/HCC), Polyp of colon, Transient cerebral ischemia, Traumatic amputation of thumb, Urinary tract infection, Vitamin B12 deficiency, and Vitamin D deficiency.  Surg Hx:  has a past surgical history that includes Gallbladder surgery; Kidney stone surgery; Other surgical history (Right, 1958); Cystoscopy w/ ureteral stent placement (Left, 11/16/2016); and cystoscopy ureteroscopy laser lithotripsy (Left, 12/5/2016).  FH: family history includes Breast cancer in his mother; Colon cancer in his maternal aunt.  SH:  reports that he has quit smoking. he has never used smokeless tobacco. He reports that he does not drink alcohol or use drugs.    Medications Prior to Admission   Medication Sig Dispense Refill Last Dose   • atorvastatin (LIPITOR) 80 MG tablet Take 80 mg by mouth Daily.   Taking   • capsicum (ZOSTRIX) 0.075 % topical cream Apply 1 application topically 4  (Four) Times a Day. TO KNEE JOINTS FOR PAIN   Taking   • carboxymethylcellulose (REFRESH PLUS) 0.5 % solution Administer 1 drop to both eyes 4 (Four) Times a Day As Needed for dry eyes (AS NEEDED DRY EYES).   Taking   • Cholecalciferol (VITAMIN D PO) Take 2,000 Units by mouth Daily.   Taking   • docusate sodium (COLACE) 100 MG capsule Take 100 mg by mouth 2 (Two) Times a Day.   Taking   • finasteride (PROSCAR) 5 MG tablet Take 5 mg by mouth Daily.   Taking   • galantamine ER (RAZADYNE ER) 8 MG 24 hr capsule Take 8 mg by mouth Daily With Breakfast.   Taking   • HYDROcodone-acetaminophen (NORCO)  MG per tablet Take 1 tablet by mouth 3 (Three) Times a Day As Needed for moderate pain (4-6). AS NEEDED FOR PAIN (Patient taking differently: Take 1 tablet by mouth Every 4 (Four) Hours As Needed for Moderate Pain . AS NEEDED FOR PAIN) 20 tablet 0 Taking   • loratadine (CLARITIN) 10 MG tablet Take 10 mg by mouth Daily.   Taking   • mirtazapine (REMERON) 30 MG tablet Take 30 mg by mouth Every Night. TAKES 1/2 TAB   Taking   • Multiple Vitamins-Minerals (MULTIVITAMIN ADULT PO) Take 1 tablet by mouth Daily.   Taking   • polyethylene glycol (MIRALAX) packet Take 17 g by mouth Daily.   Taking   • potassium chloride (KLOR-CON) 20 MEQ packet Take 20 mEq by mouth Daily. PT TAKING 1/2 20 MEQ TABLET   Taking   • senna (SENOKOT) 8.6 MG tablet Take 1 tablet by mouth 2 (Two) Times a Day.   Taking   • sertraline (ZOLOFT) 50 MG tablet Take 50 mg by mouth Daily. TAKES 1/2 TAB   Taking   • tamsulosin (FLOMAX) 0.4 MG capsule 24 hr capsule Take 0.4 mg by mouth Daily. TAKES 2 CAPSULES   Taking     Allergies   Allergen Reactions   • Aspirin      Other reaction(s): GI BLEEDING       Review of Systems   Unable to perform ROS: Acuity of condition     Vital Signs past 24hrs  BP range: BP: ()/() 73/54  Pulse range: Heart Rate:  [] 105  Resp rate range: Resp:  [20-24] 20  Temp range: Temp (24hrs), Av.7 °F (35.9 °C), Min:94.9  °F (34.9 °C), Max:99.3 °F (37.4 °C)    Oxygen range: SpO2:  [79 %-100 %] 93 %;  ;   Device (Oxygen Therapy): ventilator  55.1 kg (121 lb 6.4 oz); Body mass index is 20.84 kg/m².  No intake/output data recorded.    Adult male who looks older than stated age.  He is thin and unkempt appearance.  He has evidence for significant traumatic injury to his right hand.  He is missing his right thumb.  His right wrist joint is quite deformed.  His pupils are unequal with the right pupil smaller than the left.  Both seem unreactive to me.  He does withdraw somewhat to pain.  He moves his right hand somewhat.  His lungs reveal diminished breath sounds but equal with no wheezing and no apparent rales or rhonchi at this time.  Heart examination S1-S2 present rhythm regular no murmurs.  No obvious edema in lower extremities.  Abdomen is soft bowel sounds are present no liver spleen enlargement noted.    Results Review:    I have reviewed the laboratory and imaging data from current admission. My annotations are as noted in assessment and plan.    Medication Review:  I have reviewed the current MAR. My annotations are as noted in assessment and plan.    Plan   PCCM Problems  Severe metabolic acidosis  Acute respiratory failure, vent  Shock, possibly septic  Acute kidney injury  Severe dehydration  Severe protein calorie malnutrition      Plan of Treatment  Severe metabolic acidosis of unknown etiology.  Chest x-ray does not look impressive.  Bowel ischemia must be suspected.  We will await the next lactate level and if persistent high will order CT of the abdomen.    Bicarbonate drip has been ordered by me.  Watch out for deterioration in renal function.  Acute kidney injury noted.  May require renal consult if his lactate levels remains high.    Acute respiratory failure noted.  Ventilator settings adjusted.  Permit large minute ventilation as very severe acidosis.    Shock therefore on pressors.  Partly due to volume depletion.   Possibly also septic.  Antibiotics as ordered by you.  We will get fluids as well.    Severe protein calorie malnutrition and general poor health.  Agree with nutrition consult.    I spent +35 mins critical care time in care of this patient outside of any procedures.     Part of this note may be an electronic transcription/translation of spoken language to printed text using the Dragon Dictation System.

## 2019-02-03 NOTE — NURSING NOTE
Dr. Flanagan called again to ask if while in CT for abd patient could also have a head CT?  Admitted to unit with unequal pupils and no head CT had been completed at this time.  Okay to complete CT of head.

## 2019-02-03 NOTE — NURSING NOTE
Dr. Flanagan paged regarding patients elevated lactic, trending up. Dr. Armijo spoke with me and recommended a CT of the abd if patients lactic continued to increase.  Dr. Flanagan said we could complete the CT as long as the family was okay with transfer with him being unstable.

## 2019-02-03 NOTE — PLAN OF CARE
Problem: Skin Injury Risk (Adult)  Goal: Identify Related Risk Factors and Signs and Symptoms  Outcome: Ongoing (interventions implemented as appropriate)    Goal: Skin Health and Integrity  Outcome: Ongoing (interventions implemented as appropriate)      Problem: Fall Risk (Adult)  Goal: Identify Related Risk Factors and Signs and Symptoms  Outcome: Ongoing (interventions implemented as appropriate)    Goal: Absence of Fall  Outcome: Ongoing (interventions implemented as appropriate)      Problem: Patient Care Overview  Goal: Plan of Care Review  Outcome: Ongoing (interventions implemented as appropriate)    Goal: Individualization and Mutuality  Outcome: Ongoing (interventions implemented as appropriate)    Goal: Discharge Needs Assessment  Outcome: Ongoing (interventions implemented as appropriate)    Goal: Interprofessional Rounds/Family Conf  Outcome: Ongoing (interventions implemented as appropriate)      Problem: Wound (Includes Pressure Injury) (Adult)  Goal: Signs and Symptoms of Listed Potential Problems Will be Absent, Minimized or Managed (Wound)  Outcome: Ongoing (interventions implemented as appropriate)      Problem: Ventilation, Mechanical Invasive (Adult)  Goal: Signs and Symptoms of Listed Potential Problems Will be Absent, Minimized or Managed (Ventilation, Mechanical Invasive)  Outcome: Ongoing (interventions implemented as appropriate)      Problem: Patient Care Overview  Goal: Plan of Care Review  Outcome: Ongoing (interventions implemented as appropriate)   02/03/19 3386   Coping/Psychosocial   Plan of Care Reviewed With patient   Plan of Care Review   Progress no change   OTHER   Outcome Summary levophed currently at 0.22. bicarb drip at 100ml. propofol at 10, patient moving arms and grimacing. restraints added. urine output adequate. withdraws to pain. CT abd and head completed. will continue to monitor.       Problem: Restraint, Nonbehavioral (Nonviolent)  Goal: Rationale and  Justification  Outcome: Ongoing (interventions implemented as appropriate)    Goal: Nonbehavioral (Nonviolent) Restraint: Absence of Injury/Harm  Outcome: Ongoing (interventions implemented as appropriate)    Goal: Nonbehavioral (Nonviolent) Restraint: Achievement of Discontinuation Criteria  Outcome: Ongoing (interventions implemented as appropriate)    Goal: Nonbehavioral (Nonviolent) Restraint: Preservation of Dignity and Wellbeing  Outcome: Ongoing (interventions implemented as appropriate)

## 2019-02-03 NOTE — PROGRESS NOTES
[facility] Medicine Services  INPATIENT PROGRESS NOTE    Patient Name: Jb Verde  Date of Admission: 2/2/2019  Today's Date: 02/03/19  Length of Stay: 1  Primary Care Physician: Tamiko Vizcaino MD    Subjective   Chief Complaint: septic shock  HPI   His septic shock has improved significantly overnight.  His renal function is improving.  His lactate is declining.  He appears more perfused on exam.  His urine output is adequate.    Review of Systems   Unable to assess secondary to the patient's intubated and sedated state.      Objective    Temp:  [94.9 °F (34.9 °C)-99.3 °F (37.4 °C)] 97 °F (36.1 °C)  Heart Rate:  [] 96  Resp:  [20-24] 20  BP: ()/() 92/65  FiO2 (%):  [50 %-100 %] 50 %  Physical Exam  Constitutional: Acutely ill-appearing 84-year-old  gentleman currently on ventilatory support and sedated.  He is underweight and appears cachectic. No family currently present. Seen and discussed with his nurse, Nathalia.   Head: Normocephalic and atraumatic.   Eyes: Conjunctivae are normal. Pupils are constricted and sluggish. Patient reacts nicely to confrontation of the left eye, but not on the right.    Neck: Neck supple. No JVD present. Endotracheal tube secure.   Cardiovascular: Normal rate, regular rhythm, normal heart sounds and intact distal pulses. Exam reveals no gallop and no friction rub. No murmur heard.  Pulmonary/Chest: Ventilated, coarse throughout.   Abdominal: Soft. Bowel sounds are normal. He exhibits no distension. There is no tenderness. There is no rebound.   Genitourinary Comments: Perez catheter placed 2/2.   Musculoskeletal: Normal range of motion. He exhibits no edema, tenderness or deformity.   Right femoral central venous catheter placed by Dr. Carias in the emergency department on 2/2.    Neurological: He displays normal reflexes. No cranial nerve deficit. He exhibits normal muscle tone. Withdraws to painful stimuli equally  in all extremities, but mostly sedate.   Skin: Skin is warm, dry. Capillary refill takes less than 2 seconds. No mottling.     Intake/Output Summary (Last 24 hours) at 2/3/2019 0818  Last data filed at 2/3/2019 0715  Gross per 24 hour   Intake 3467.6 ml   Output 1025 ml   Net 2442.6 ml     Results Review:  I have reviewed the labs, radiology results, and diagnostic studies.    Laboratory Data:   Results from last 7 days   Lab Units 02/03/19  0620 02/03/19  0430 02/02/19  1453   WBC 10*3/mm3 9.61 8.28 21.92*   HEMOGLOBIN g/dL 12.2* 12.5* 14.5   HEMATOCRIT % 38.2* 38.5* 48.2   PLATELETS 10*3/mm3 153 159 267     Results from last 7 days   Lab Units 02/03/19  0532 02/02/19  1453 02/02/19  1440   SODIUM mmol/L 153* 154*  --    SODIUM, ARTERIAL mmol/L  --   --  161*   POTASSIUM mmol/L 2.8* 4.2  --    CHLORIDE mmol/L 119* 124*  --    CO2 mmol/L 27.0 18.0*  --    BUN mg/dL 22* 21  --    CREATININE mg/dL 1.15 1.57*  --    CALCIUM mg/dL 7.0* 8.4  --    BILIRUBIN mg/dL 1.1* 1.0  --    ALK PHOS U/L 51 85  --    ALT (SGPT) U/L 45 52  --    AST (SGOT) U/L 41 39  --    GLUCOSE mg/dL 180* 202*  --      Results from last 7 days   Lab Units 02/03/19  0532 02/03/19  0430   MAGNESIUM mg/dL 1.4 1.3*   PHOSPHORUS mg/dL  --  2.1*     Lab Results   Lab Value Date/Time    TROPONINI 0.413 (H) 02/03/2019 0430    TROPONINI 0.093 (H) 02/02/2019 2143    TROPONINI 0.057 (H) 02/02/2019 1453     Lactate (mmol/L)   Date/Time Value   02/03/2019 0532 6.0 (C)   02/02/2019 2143 7.9 (C)   02/02/2019 1908 9.0 (C)     Culture Data:   Blood Culture   Date Value Ref Range Status   02/02/2019 No growth at less than 24 hours  Preliminary   02/02/2019 No growth at less than 24 hours  Preliminary     Radiology Data:   Imaging Results (last 24 hours)     Procedure Component Value Units Date/Time    XR Chest 1 View [[id number]] Updated:  02/03/19 0456    CT Abdomen Pelvis Without Contrast [[id number]] Collected:  02/02/19 2126     Updated:  02/02/19 2132     Narrative:       EXAMINATION: CT ABDOMEN PELVIS WO CONTRAST-      2/2/2019 9:00 PM CST     HISTORY: Abd pain, gastroenteritis or colitis suspected; R06.03-Acute  respiratory distress; A41.9-Sepsis, unspecified organism; N17.9-Acute  kidney failure, unspecified; E87.2-Acidosis     In order to have a CT radiation dose as low as reasonably achievable  Automated Exposure Control was utilized for adjustment of the mA and/or  KV according to patient size.     DLP in mGycm= 146.     Large hiatal hernia with partially intrathoracic stomach.     Dependent bibasilar pneumonia.  Normal heart size.     Cholecystectomy clips.  Normal noncontrast appearance of the liver and spleen.  The pancreas shows no abnormality.     There is a 4.7 cm cyst at the upper right kidney. The kidneys show small  nonobstructing stones. There is no hydronephrosis and no ureteral stone  is seen.     Rectal fecal impaction is present.     There is no bowel obstruction or free fluid.     No aortic aneurysm.     Summary:  1. Intrathoracic stomach.  2. Bibasilar pneumonia.  3. Rectal fecal impaction.  4. No mass or abscess.                                   This report was finalized on 02/02/2019 21:28 by Dr. Salvador Perez MD.    CT Head Without Contrast [[id number]] Collected:  02/02/19 2125     Updated:  02/02/19 2129    Narrative:       EXAMINATION: CT HEAD WO CONTRAST-      2/2/2019 9:00 PM CST     HISTORY: pupils uneven; R06.03-Acute respiratory distress; A41.9-Sepsis,  unspecified organism; N17.9-Acute kidney failure, unspecified;  E87.2-Acidosis     In order to have a CT radiation dose as low as reasonably achievable  Automated Exposure Control was utilized for adjustment of the mA and/or  KV according to patient size.     DLP in mGycm= 743.     Atrophy and small vessel disease.  No hemorrhage or mass effect.  No infarct is seen.     There is some patchy mucosal thickening within the left frontal sinus  and within left ethmoid air cells. Patchy  mucosal thickening also seen  within the left side of the sphenoid sinus.     Mastoid air cells are clear.     No skull abnormality is seen.     Summary:  1. Age-related changes with atrophy and small vessel disease.  2. Sinus inflammatory disease.  3. No acute intracranial abnormality.                                   This report was finalized on 02/02/2019 21:26 by Dr. Salvador Perez MD.    US Renal Bilateral [[id number]] Collected:  02/02/19 1844     Updated:  02/02/19 1848    Narrative:       EXAMINATION: US RENAL BILATERAL-     2/2/2019 6:12 PM CST     HISTORY: acute renal failure, history of nephrolithiasis; R06.03-Acute  respiratory distress; A41.9-Sepsis, unspecified organism; N17.9-Acute  kidney failure, unspecified; E87.2-Acidosis.     Grayscale and color flow ultrasound evaluation of the kidneys compared  to 1/17/2017.     Left kidney = 9.9 x 5.9 x 4.8 cm.     Right kidney = 11.7 x 5.6 x 5.6 cm.     Nonobstructing calcification within the midportion of the left kidney.     Lower pole right renal cyst measures 1.1 x 0.9 cm.  Upper pole right renal cyst measures 4.8 x 5.4 cm.  Nonobstructing lower pole right renal calcification.     No hydronephrosis.     Summary:  1. Renal cysts with no sign of obstruction.  This report was finalized on 02/02/2019 18:45 by Dr. Salvador Perez MD.    XR Chest 1 View [[id number]] Collected:  02/02/19 1453     Updated:  02/02/19 1458    Narrative:       EXAMINATION:  XR CHEST 1 VW-  2/2/2019 2:49 PM CST     HISTORY: Patient was intubated.     COMPARISON: 12/5/2016.     FINDINGS:  The endotracheal tube tip is at the T4 level. There is  minimal linear infiltrate in the left lung base medially. There is  minimal patchy infiltrate in the right upper and right lower lung  fields. The heart is normal in size.       Impression:       1. Endotracheal tube tip is at the T4 level.  2. Patchy infiltrates bilaterally may represent minimal pneumonia or  atelectasis.        This report was  finalized on 02/02/2019 14:55 by Dr. Dexter Ernandez MD.        Results from last 7 days   Lab Units 02/03/19  0257   PH, ARTERIAL pH units 7.518*   PO2 ART mm Hg 93.9   PCO2, ARTERIAL mm Hg 30.4*   HCO3 ART mmol/L 24.7     I have reviewed the patient's current medications.     Assessment/Plan     Active Hospital Problems    Diagnosis   • **Acute respiratory failure with hypoxia (CMS/HCC)   • Severe sepsis (CMS/HCC)   • Shock, septic (CMS/HCC)   • Hypokalemia   • Hypophosphatemia   • Hypomagnesemia   • Bilateral pneumonia   • Lactic acidosis   • Leukocytosis   • Sinus tachycardia   • Elevated troponin   • Acute renal failure (ARF) (CMS/HCC)   • Hypernatremia   • Acute UTI (urinary tract infection)     Plan:   He received the sepsis bolus and additional bolus fluids after presentation.  He was adequately volume resuscitated.  His urine output has been adequate.  His lactate is declining.  Continue IV fluids.  Wean Levophed as tolerated.     His systemic acidosis has resolved.  His lactate is declining.  He was given multiple ampules of sodium bicarbonate yesterday evening.  Stop sodium bicarbonate infusion.  Continue lactated ringers.     Continue vancomycin and Zosyn.  Blood cultures with no growth thus far.  Sputum pending.  Streptococcal and Legionella urinary antigens are negative.  Influenza swab was negative.  Urine culture in progress.  His leukocytosis has resolved.  No febrile episodes overnight.  He was actually hypothermic yesterday in the ER.  Normothermic at present.     Consulted pulmonary to assist in his critical illness and ventilatory support.  Routine vent orders.  Transitioned sedation to Diprivan rather than Versed last night.     He does have a slight troponin elevation. The trend is flat..  No cardiology consultation or further workup at present.  Echocardiogram today.    Renal ultrasound showed cyst with no signs of obstruction. He does have a history of nephrolithiasis and has had to see   Young for this in the past.  He was also sent for a CT scan of the abdomen and pelvis without contrast last night by pulmonology given his lactic acidosis.  This showed an intrathoracic stomach with bibasilar pneumonia.  Rectal fecal impaction.  No mass or abscess.  His renal function is improving.    Replace potassium by giving IV potassium phosphate.  Will also give IV magnesium.     Hold all home oral medications at present.  Nothing on his home medication list is absolutely necessary in his care at this point in time.     Lovenox for DVT prophylaxis.  Pepcid for peptic ulcer prophylaxis while on ventilatory support.    I plan to involve palliative care tomorrow when they are available.  The patient has shown significant improvements in his acute illness overnight, however, has significant underlying comorbidities, advanced age, difficulty feeding, failure to thrive.  His family continues to wish for aggressive measures and he remains a full code.    Mario Tran,    02/03/19   8:18 AM     Approximately 35 minutes of critical care time were spent managing the patient exclusive of billable procedures.

## 2019-02-04 NOTE — PROGRESS NOTES
[facility]  [phone]  Vasquez Armijo MD    Subjective:  LOS: 2    Off vent. NIV overnight. Currently sats 92% on RA when NIV taken off. States he is better. On levophed for hypotension.    Objective   Vital Signs past 24hrs    Temp range: Temp (24hrs), Av.4 °F (36.3 °C), Min:96.2 °F (35.7 °C), Max:98.2 °F (36.8 °C)    BP range: BP: ()/(54-96) 102/54  Pulse range: Heart Rate:  [] 71  Resp rate range: Resp:  [11-24] 11    Device (Oxygen Therapy): NPPV/NIVFlow (L/min):  [3-4] 4  Oxygen range:SpO2:  [0 %-100 %] 100 %   FiO2 (%):  [30 %-70 %] 35 %  S RR:  [12] 12  PEEP/CPAP (cm H2O):  [5 cm H20] 5 cm H20  LA SUP:  [5 cm H20-8 cm H20] 5 cm H20  MAP (cm H2O):  [8.2] 8.2  55.1 kg (121 lb 6.4 oz); Body mass index is 20.84 kg/m².    Intake/Output Summary (Last 24 hours) at 2019 1005  Last data filed at 2019 0930  Gross per 24 hour   Intake 2641.8 ml   Output 1475 ml   Net 1166.8 ml       Physical Exam   Constitutional: He appears cachectic. He is intubated.   Eyes:   Unequal pupils   Cardiovascular: Normal rate and regular rhythm.   No murmur heard.  Pulmonary/Chest: He is intubated. He has decreased breath sounds. He has no wheezes. He has no rales (difficult to hear as can't sit him up (contractures)).   Abdominal: Soft. Bowel sounds are normal. There is no tenderness.   Musculoskeletal: He exhibits no edema.   Neurological: He is alert.   Nursing note and vitals reviewed.    Results Review:    I have reviewed the laboratory and imaging data since the last note by [facility] physician.  My annotations are noted in assessment and plan.    Medication Review:  I have reviewed the current MAR.  My annotations are noted in assessment and plan.      norepinephrine 0.02-0.3 mcg/kg/min Last Rate: 0.02 mcg/kg/min (19 6094)   propofol 5-50 mcg/kg/min Last Rate: Stopped (19 8911)   sodium chloride 100 mL/hr      Plan   PCCM Problems  Severe metabolic acidosis - resolving  Acute respiratory  failure, vent, liberated 2/3/19  Shock, septic  LLL pneumonia worse, H.flu bacteremia  UTI, Klebsiella  Acute kidney injury, better  Severe dehydration, better  Severe protein calorie malnutrition  Acute hypokalemia  Hypernatremia and hyperchloremia  Dysphagia    Plan of Treatment  Metabolic acidosis improving.  Lactate better. Likely all septic shock from H.flu bacteremia.    On NC and tolerating. Use NIV prn.    Shock requiring pressors.  Continue to wean as tolerated.  AVOID more fluids - his cxr is showing CHF.    Blossomed out significant LLL pneumonia. Also UTI. Has bacteremia. On abx.    Persistent hypernatremia and hyperchloremia.  Suggest D5W to prevent worsening.    Further bowel regimen.    Needs speech eval before he is permitted to take po.    Can increase lovenox to 40-mg sc daily.    I spent +35 mins critical care time in care of this patient outside of any procedures.    Vasquez Armijo MD  02/04/19  10:05 AM    Part of this note may be an electronic transcription/translation of spoken language to printed text using the Dragon Dictation System.

## 2019-02-04 NOTE — PLAN OF CARE
Problem: Patient Care Overview  Goal: Plan of Care Review  Outcome: Ongoing (interventions implemented as appropriate)   02/04/19 3599   Coping/Psychosocial   Plan of Care Reviewed With other (see comments)  (YOEL Martel )   Plan of Care Review   Progress no change   OTHER   Outcome Summary SLP consult received Sunday, as patient is s/p extubation. ST attempted swallow evaluation yesterday; however, patient was not appropriate. ST visited patient this AM to attempt bedside swallow eval. Patient on bipap at this time. Spoke with RNTahmina, who requested ST hold off at this time, as patient is not yet appropriate for PO intake. Will follow up on 02/05/2019.

## 2019-02-04 NOTE — PROGRESS NOTES
Continued Stay Note   [place]     Patient Name: Jb Verde  MRN: [id number]  Today's Date: 2/4/2019    Admit Date: 2/2/2019    Discharge Plan     Row Name 02/04/19 0951       Plan    Plan  Patient is now extubated, remains in CCU with bipap on, no family present.  Will need to speak with family when they arrive regarding discharge plan.        Discharge Codes    No documentation.             CARMELINA Auguste

## 2019-02-04 NOTE — CONSULTS
Palliative Care Initial Consult   Attending Physician: Dillan Tapia DO  Referring Provider: Mario Tran DO    Reason for Referral: assistance with clarification of goals of care  Family/Support: no family at bedside  Goals of Care: TBD.  Code Status and Medical Interventions:   Ordered at: 02/04/19 1442     Level Of Support Discussed With:    Next of Kin (If No Surrogate)     Code Status:    No CPR     Medical Interventions (Level of Support Prior to Arrest):    Comfort Measures     Comments:    patient's daughter         HPI:   84 y.o. male with past medical history significant for underlying dementia-he is currently prescribed [facility], peripheral vascular disease, history of tobacco abuse, and recent hospitalization a few weeks ago at [facility] in [place], [place] where he was treated for pneumonia. Patient presented to [facility] on 2/2/2019 related to shortnes of breath and severe respiratory distres requiring intubation by EMS.  He is admitted for severe sepsis with septic shock related to community-acquired pneumonia and urinary tract infection, acute kidney injury, severe dehydration, severe protein calorie malnutrition with history of dysphagia and difficulty eating over the last 1 month, and multiple documented wounds.  He has required continued ventilator support (extubated on 2/3/2019 with Bipap use) and vasopressor support.  CT of the abdomen pelvis revealed intrathoracic stomach, by basilar pneumonia, and rectal fecal impaction.  CT of the head revealed age-related changes with atrophy and small vessel disease, sinus inflammatory disease, no acute intracranial abnormality.  Chest x-ray reveals dense consolidation in the left lung base consistent with no pneumonia, worsening bilateral perihilar infiltrates concerning for pulmonary edema and there may be small pleural effusions.  Lab work reveals potassium 3.1, creatinine 0.92 (acute kidney injury appears  resolved), albumin 2.30, lactate 3.40 (down from 7.2), WBC 15.17.  Urine culture positive for Klebsiella pneumonia.  Blood cultures positive for Haemophilus influenzae. Palliative Care Spoke With: family reports patient's quality of life and functional status have declined significantly over the last several months.  The patient has become bedbound and no longer taking nutrition by mouth.  He has a history of dementia and initially diagnosed at least 5 years ago. Due to the Palliative Care Topics Discussed: palliative care, goals of care, care options, resuscitation status, [facility] and discharge options we will establish an advance care plan.   Advance Care Planning   Advance Care Planning Discussion: Conversation in the presence of patient's daughter Hanh Verde and to family friends.  Ms. Verde verbalizes a general understanding of the patient's chronic comorbidities, current decline in health status, debility, and advanced age.  We discussed care goals to include but not limited to CPR and care interventions.  We discussed the patient's terminal diagnosis of end-stage Alzheimer's dementia.  Based on this discussion patient's daughter does not believe the patient will consider this any quality of life.  She understands care interventions may possibly prolong suffering and not support or promote quality.  At this juncture patient's daughter wishes to transition to comfort measures.  We discussed medications to promote comfort.  We explored options after discharge to include home with hospice versus skilled nursing facility.  Questions answered and support given.    Review of Systems   Unable to perform ROS: acuity of condition (dementia)         Past Medical History:   Diagnosis Date   • Acid reflux    • Allergic rhinitis    • Arthritis    • Monique esophagus    • Carotid atherosclerosis    • Cognitive impairment    • Delirium    • Folic acid deficiency    • Hyperlipemia    • Hypertension    • Kidney stone    •  Nail dystrophy    • Osteoarthritis of knee    • Peripheral vascular disease (CMS/HCC)    • Polyp of colon    • Transient cerebral ischemia    • Traumatic amputation of thumb    • Urinary tract infection    • Vitamin B12 deficiency    • Vitamin D deficiency      Past Surgical History:   Procedure Laterality Date   • CYSTOSCOPY URETEROSCOPY LASER LITHOTRIPSY Left 12/5/2016    Procedure: CYSTOSCOPY URETEROSCOPY LASER LITHOTRIPSY;  Surgeon: Rolf Stanley MD;  Location: [facility] OR;  Service:    • CYSTOSCOPY W/ URETERAL STENT PLACEMENT Left 11/16/2016    Procedure: CYSTOSCOPY WITH LEFT DOUBLE J STENT INSERTION WITH LEFT EXTRACORPOREAL SHOCKWAVE LITHOTRIPSY;  Surgeon: Rolf Stanley MD;  Location: [facility] OR;  Service:    • GALLBLADDER SURGERY     • KIDNEY STONE SURGERY     • OTHER SURGICAL HISTORY Right 1958    R/T TRAUMATIC INJURY AT WORK      Social History     Socioeconomic History   • Marital status:      Spouse name: Not on file   • Number of children: Not on file   • Years of education: Not on file   • Highest education level: Not on file   Social Needs   • Financial resource strain: Not on file   • Food insecurity - worry: Not on file   • Food insecurity - inability: Not on file   • Transportation needs - medical: Not on file   • Transportation needs - non-medical: Not on file   Occupational History   • Not on file   Tobacco Use   • Smoking status: Former Smoker   • Smokeless tobacco: Never Used   • Tobacco comment: 1960   Substance and Sexual Activity   • Alcohol use: No   • Drug use: No   • Sexual activity: Not on file   Other Topics Concern   • Not on file   Social History Narrative   • Not on file       Allergies   Allergen Reactions   • Aspirin      Other reaction(s): GI BLEEDING       Current medication reviewed for route, type, dose and frequency and are current per MAR at time of dictation.      Diagnostics: Reviewed    Patient Active Problem List   Diagnosis   • Acute respiratory failure  "with hypoxia (CMS/HCC)   • Severe sepsis (CMS/HCC)   • Shock, septic (CMS/HCC)   • Bilateral pneumonia   • Lactic acidosis   • Leukocytosis   • Sinus tachycardia   • Elevated troponin   • Acute renal failure (ARF) (CMS/HCC)   • Hypernatremia   • Acute UTI (urinary tract infection)   • Hypokalemia   • Hypophosphatemia   • Hypomagnesemia       Physical Exam:    /56   Pulse 70   Temp 97.6 °F (36.4 °C) (Axillary)   Resp 21   Ht 162.6 cm (64\")   Wt 55.1 kg (121 lb 6.4 oz)   SpO2 94%   BMI 20.84 kg/m²     Physical Exam   Constitutional: He appears cachectic. He has a sickly appearance. He appears ill. He appears distressed. Nasal cannula in place.   HENT:   Head: Normocephalic and atraumatic.   Cardiovascular: Normal rate, regular rhythm and normal heart sounds. Exam reveals decreased pulses.   Doppler bilateral lower extremity pulses. Currently on Levophed drip.   Pulmonary/Chest: Effort normal and breath sounds normal.   Abdominal: Bowel sounds are decreased.   cachetic   Genitourinary:   Genitourinary Comments: sesay   Musculoskeletal: He exhibits deformity.   Right hand deformity. Contractures.   Neurological: He is alert. He is disoriented.   Skin:   Multiple documented wounds.   Psychiatric: His mood appears anxious. Cognition and memory are impaired.         Patient status: Disease state: Controlled with current treatments.  Functional status: Palliative Performance Scale Score: Performance 10% based on the following measures: Ambulation: Totally bed bound, Activity and Evidence of Disease: Unable to do any work, extensive evidence of disease, Self-Care: Total care required,  Intake: Mouth care only, LOC: Drowsy or comatose   Nutritional status: Albumin 2.30. Body mass index is 20.84 kg/m².  Screening Status/Interventions  Psychosocial Needs: neg  Spiritual Needs: neg  Goals of Care/ACP: pos  Goals of Care/ACP Intervened: yes   Palliative Care Acuity  Psychosocial Acuity: normal complexity  Spiritual " Acuity: normal complexity  Family support: The patient receives support from his daughter..  POA/Healthcare surrogate-no advance directive on file    Impression/Problem List:    1.  Alzheimer's dementia-FAST 7F  2.  Acute respiratory failure with hypoxia  3.  Severe sepsis with septic shock  4.  Bilateral pneumonia  5.  Urinary tract infection  6.  Severe protein calorie malnutrition  7.  Dysphagia  8.  Debility-bedbound    Recommendations/Plan:  1. plan: Goals of care include To be determined.  Currently CODE STATUS CPR\full interventions.   -Planned family meeting with patient's daughter at 2 PM this afternoon.  @2 PM patient's daughter has elected to transition to comfort care.  Daughter is aware patient will has a terminal diagnosis of end-stage dementia and this hospitalization will likely result in the patient's death.  We briefly discussed discharge plans including home with hospice versus skilled nursing facility and further conversations will to be dependent on how patient progresses overnight.  Outcomes  Code Status After Consult: DNR/DNI  Number of Family Meetings: 1  Number of Days Until Referral Purpose Met/Improved: 1  Other Outcomes: code status clarified     2.  Comfort care  -Morphine concentrated solution sublingual as needed.  -Ativan concentrated solution 0.5 mg sublingual every 8 hours and as needed anxiety.     Thank you for this consult and allowing us to participate in patient's plan of care. Palliative Care Team will continue to follow patient.     Time spent: 105 minutes spent reviewing medical and medication records, assessing and examining patient, discussing with family, answering questions, providing some guidance about a plan and documentation of care, and coordinating care with other healthcare members, with > 50% time spent face to face.   60 minutes spent on advance care planning.    Jodie Corona, SONIYA  2/4/2019        [unfilled]  [unfilled]

## 2019-02-04 NOTE — PLAN OF CARE
Problem: Skin Injury Risk (Adult)  Goal: Identify Related Risk Factors and Signs and Symptoms  Outcome: Ongoing (interventions implemented as appropriate)    Goal: Skin Health and Integrity  Outcome: Ongoing (interventions implemented as appropriate)      Problem: Fall Risk (Adult)  Goal: Identify Related Risk Factors and Signs and Symptoms  Outcome: Ongoing (interventions implemented as appropriate)    Goal: Absence of Fall  Outcome: Ongoing (interventions implemented as appropriate)      Problem: Patient Care Overview  Goal: Plan of Care Review  Outcome: Ongoing (interventions implemented as appropriate)    Goal: Individualization and Mutuality  Outcome: Ongoing (interventions implemented as appropriate)    Goal: Discharge Needs Assessment  Outcome: Ongoing (interventions implemented as appropriate)    Goal: Interprofessional Rounds/Family Conf  Outcome: Ongoing (interventions implemented as appropriate)      Problem: Wound (Includes Pressure Injury) (Adult)  Goal: Signs and Symptoms of Listed Potential Problems Will be Absent, Minimized or Managed (Wound)  Outcome: Ongoing (interventions implemented as appropriate)      Problem: Ventilation, Mechanical Invasive (Adult)  Goal: Signs and Symptoms of Listed Potential Problems Will be Absent, Minimized or Managed (Ventilation, Mechanical Invasive)  Outcome: Ongoing (interventions implemented as appropriate)      Problem: Patient Care Overview  Goal: Plan of Care Review  Outcome: Ongoing (interventions implemented as appropriate)   02/04/19 0523   Coping/Psychosocial   Plan of Care Reviewed With patient   Plan of Care Review   Progress declining   OTHER   Outcome Summary pt Afebrile, levophed off since 2330. no signs of pain. extubated 2/3/19, placed on 3 L NC. now on bipap due to O2 sat decreasing and chest xray. Potassium runs going for K of 3.1. will continue to monitor. 2/4/19 0527     Goal: Individualization and Mutuality  Outcome: Ongoing (interventions  implemented as appropriate)    Goal: Discharge Needs Assessment  Outcome: Ongoing (interventions implemented as appropriate)    Goal: Interprofessional Rounds/Family Conf  Outcome: Ongoing (interventions implemented as appropriate)      Problem: Restraint, Nonbehavioral (Nonviolent)  Goal: Rationale and Justification  Outcome: Outcome(s) achieved Date Met: 02/04/19    Goal: Nonbehavioral (Nonviolent) Restraint: Absence of Injury/Harm  Outcome: Ongoing (interventions implemented as appropriate)    Goal: Nonbehavioral (Nonviolent) Restraint: Achievement of Discontinuation Criteria  Outcome: Outcome(s) achieved Date Met: 02/04/19    Goal: Nonbehavioral (Nonviolent) Restraint: Preservation of Dignity and Wellbeing  Outcome: Outcome(s) achieved Date Met: 02/04/19

## 2019-02-04 NOTE — PROGRESS NOTES
[facility] Medicine Services  INPATIENT PROGRESS NOTE    Length of Stay: 2  Date of Admission: 2/2/2019  Primary Care Physician: Tamiko Vizcaino MD    Subjective     Chief Complaint:     Septic shock    HPI     The patient's blood pressure remains low.  He was tolerating nasal cannula yesterday after extubation but has required BiPAP today.  Blood culture reveals Haemophilus and urine culture reveals Klebsiella, both sensitive to Rocephin.  Antibiotics will be changed.  The patient remains hypotensive this morning.  Lactate is decreased but remains elevated at 3.4.  I will give him a liter bolus of normal saline and start an IV of normal saline at 100 cc/h, discontinuing lactated Ringer's.  The patient responds to stimulation by moaning.  He exhibits no intentional movement and cannot follow commands.    Review of Systems   Unable to assess secondary to the patient's inability to respond.  All pertinent negatives and positives are as above. All other systems have been reviewed and are negative unless otherwise stated.     Objective    Temp:  [96.2 °F (35.7 °C)-98.2 °F (36.8 °C)] 96.7 °F (35.9 °C)  Heart Rate:  [] 73  Resp:  [11-24] 11  BP: ()/(57-96) 96/57  FiO2 (%):  [30 %-70 %] 35 %    Lab Results (last 24 hours)     Procedure Component Value Units Date/Time    Blood Culture ID, PCR - Blood, Wrist, Left [[id number]]  (Abnormal) Collected:  02/02/19 1453    Specimen:  Blood from Wrist, Left Updated:  02/04/19 0716     BCID, PCR Haemophilus influenzae. Identification by BCID PCR.    Blood Culture - Blood, Wrist, Left [[id number]]  (Abnormal) Collected:  02/02/19 1453    Specimen:  Blood from Wrist, Left Updated:  02/04/19 0715     Blood Culture Abnormal Stain     Gram Stain Gram negative bacilli    Urine Culture - Urine, Urine, Catheter In/Out [[id number]]  (Abnormal)  (Susceptibility) Collected:  02/02/19 1509    Specimen:  Urine, Catheter In/Out Updated:  02/04/19  0713     Urine Culture >100,000 CFU/mL Klebsiella pneumoniae ssp pneumoniae    Narrative:       Cefazolin results may be used to predict the potential effectiveness of oral cephalosporins for treating uncomplicated urinary tract infections.    Susceptibility      Klebsiella pneumoniae ssp pneumoniae     KAYLIN     Ampicillin Resistant     Ampicillin + Sulbactam Susceptible     Cefazolin Susceptible     Cefepime Susceptible     Ceftriaxone Susceptible     Ertapenem Susceptible     ESBL Confirmation Test Negative     Gentamicin Susceptible     Levofloxacin Susceptible     Meropenem Susceptible     Nitrofurantoin Resistant     Piperacillin + Tazobactam Susceptible     Trimethoprim + Sulfamethoxazole Susceptible                    Blood Gas, Arterial [[id number]]  (Abnormal) Collected:  02/04/19 0418    Specimen:  Arterial Blood Updated:  02/04/19 0421     Site Left Radial     Amarjit's Test Positive     pH, Arterial 7.531 pH units      Comment: 83 Value above reference range        pCO2, Arterial 32.4 mm Hg      Comment: 84 Value below reference range        pO2, Arterial 95.0 mm Hg      HCO3, Arterial 27.1 mmol/L      Comment: 83 Value above reference range        Base Excess, Arterial 4.6 mmol/L      Comment: 83 Value above reference range        O2 Saturation, Arterial 97.9 %      Temperature 37.0 C      Barometric Pressure for Blood Gas 747 mmHg      Modality BiPap     FIO2 70 %      Ventilator Mode NA     IPAP 12     Comment: Meter: [id number]     :  [id number]        EPAP 5     Collected by [id number]    Comprehensive Metabolic Panel [[id number]]  (Abnormal) Collected:  02/04/19 0241    Specimen:  Blood Updated:  02/04/19 0316     Glucose 85 mg/dL      BUN 20 mg/dL      Creatinine 0.92 mg/dL      Sodium 151 mmol/L      Potassium 3.1 mmol/L      Chloride 121 mmol/L      CO2 24.0 mmol/L      Calcium 6.9 mg/dL      Total Protein 4.4 g/dL      Albumin 2.30 g/dL      ALT (SGPT) 41 U/L      AST (SGOT) 39 U/L       Alkaline Phosphatase 69 U/L      Total Bilirubin 1.1 mg/dL      eGFR Non African Amer 78 mL/min/1.73      Globulin 2.1 gm/dL      A/G Ratio 1.1 g/dL      BUN/Creatinine Ratio 21.7     Anion Gap 6.0 mmol/L     Narrative:       The MDRD GFR formula is only valid for adults with stable renal function between ages 18 and 70.    Magnesium [[id number]]  (Normal) Collected:  02/04/19 0241    Specimen:  Blood Updated:  02/04/19 0316     Magnesium 1.9 mg/dL     Phosphorus [[id number]]  (Normal) Collected:  02/04/19 0241    Specimen:  Blood Updated:  02/04/19 0316     Phosphorus 3.0 mg/dL     Lactic Acid, Plasma [[id number]]  (Abnormal) Collected:  02/04/19 0241    Specimen:  Blood Updated:  02/04/19 0316     Lactate 3.4 mmol/L     Blood Gas, Arterial [[id number]]  (Abnormal) Collected:  02/04/19 0300    Specimen:  Arterial Blood Updated:  02/04/19 0309     Site Left Radial     Amarjit's Test Positive     pH, Arterial 7.543 pH units      Comment: 83 Value above reference range        pCO2, Arterial 31.5 mm Hg      Comment: 84 Value below reference range        pO2, Arterial 46.7 mm Hg      Comment: 85 Value below critical limit        HCO3, Arterial 27.1 mmol/L      Comment: 83 Value above reference range        Base Excess, Arterial 4.9 mmol/L      Comment: 83 Value above reference range        O2 Saturation, Arterial 85.8 %      Comment: 84 Value below reference range        Temperature 37.0 C      Barometric Pressure for Blood Gas 747 mmHg      Modality Nasal Cannula     Flow Rate 4.0 lpm      Ventilator Mode NA     Notified [facility] [place] [id number]     Notified By [id number]     Notified Time 02/04/2019 03:09     Collected by [id number]     Comment: Meter: [id number]     :  [id number]       CBC (No Diff) [[id number]]  (Abnormal) Collected:  02/04/19 0241    Specimen:  Blood Updated:  02/04/19 0258     WBC 15.17 10*3/mm3      RBC 4.28 10*6/mm3      Hemoglobin 12.4 g/dL      Hematocrit 38.3 %      MCV 89.5 fL      MCH 29.0 pg      MCHC 32.4  g/dL      RDW 15.9 %      RDW-SD 51.2 fl      MPV 12.1 fL      Platelets 126 10*3/mm3     Lactic Acid, Plasma [[id number]]  (Abnormal) Collected:  02/03/19 2152    Specimen:  Blood Updated:  02/03/19 2229     Lactate 4.7 mmol/L     Lactic Acid, Plasma [[id number]]  (Abnormal) Collected:  02/03/19 1831    Specimen:  Blood Updated:  02/03/19 1850     Lactate 6.4 mmol/L     Blood Culture - Blood, Leg, Right [[id number]] Collected:  02/02/19 1453    Specimen:  Blood from Leg, Right Updated:  02/03/19 1615     Blood Culture No growth at 24 hours    Basic Metabolic Panel [[id number]]  (Abnormal) Collected:  02/03/19 1450    Specimen:  Blood Updated:  02/03/19 1513     Glucose 106 mg/dL      BUN 23 mg/dL      Creatinine 1.09 mg/dL      Sodium 154 mmol/L      Potassium 3.3 mmol/L      Chloride 117 mmol/L      CO2 27.0 mmol/L      Calcium 7.3 mg/dL      eGFR Non African Amer 64 mL/min/1.73      BUN/Creatinine Ratio 21.1     Anion Gap 10.0 mmol/L     Narrative:       The MDRD GFR formula is only valid for adults with stable renal function between ages 18 and 70.    Magnesium [[id number]]  (Normal) Collected:  02/03/19 1450    Specimen:  Blood Updated:  02/03/19 1513     Magnesium 1.8 mg/dL     Lactic Acid, Plasma [[id number]]  (Abnormal) Collected:  02/03/19 1450    Specimen:  Blood Updated:  02/03/19 1512     Lactate 7.2 mmol/L     Blood Gas, Arterial [[id number]]  (Abnormal) Collected:  02/03/19 1313    Specimen:  Arterial Blood Updated:  02/03/19 1319     Site Left Radial     Amarjit's Test Positive     pH, Arterial 7.545 pH units      Comment: 83 Value above reference range        pCO2, Arterial 29.7 mm Hg      Comment: 84 Value below reference range        pO2, Arterial 63.8 mm Hg      Comment: 84 Value below reference range        HCO3, Arterial 25.6 mmol/L      Base Excess, Arterial 3.8 mmol/L      Comment: 83 Value above reference range        O2 Saturation, Arterial 94.5 %      Temperature 37.0 C      Barometric Pressure for  Blood Gas 749 mmHg      Modality Ventilator     FIO2 30 %      Ventilator Mode PSV     PEEP 5.0     PSV 5.0 cmH2O      Collected by [id number]     Comment: Meter: [id number]     :  [id number]       Procalcitonin [[id number]]  (Abnormal) Collected:  02/03/19 1050    Specimen:  Blood Updated:  02/03/19 1240     Procalcitonin 7.89 ng/mL     Narrative:       SIRS, sepsis, severe sepsis, and septic shock are categorized according to the criteria of the consensus conference of the American College of Chest Physicians/Society of Critical Care Medicine.    PCT < 0.5 ng/mL     Systemic infection (sepsis) is not likely.    PCT >0.5 and < 2.0 ng/mL Systemic infection (sepsis) is possible, but other conditions are known to elevate PCT as well.    PCT > 2.0 ng/mL     Systemic infection (sepsis) is likely, unless other causes are known.      PCT > 10.0 ng/mL    Important systemic inflammatory response, almost exclusively due to severe bacterial sepsis or septic shock.    PCT values of < 0.5 ng/mL do not exclude an infection, because localized infections (without systemic signs) may be associated with such low concentrations, or a systemic infection in its initial stages (<6 hours).  Increased PCT can occur without infection.  PCT concentrations between 0.5 and 2.0 ng/mL should be interpreted taking into account the patients history.  It is recommended to retest PCT within 6-24 hours if any concentrations < 2.0 ng/mL are obtained.    Lactic Acid, Plasma [[id number]]  (Abnormal) Collected:  02/03/19 1050    Specimen:  Blood Updated:  02/03/19 1126     Lactate 5.7 mmol/L           Imaging Results (last 24 hours)     Procedure Component Value Units Date/Time    XR Chest 1 View [[id number]] Collected:  02/04/19 0718     Updated:  02/04/19 0723    Narrative:       EXAMINATION:  XR CHEST 1 VW-  2/4/2019 4:37 AM CST     HISTORY: R06.03-Acute respiratory distress; A41.9-Sepsis, unspecified  organism; N17.9-Acute kidney failure,  unspecified; E87.2-Acidosis     COMPARISON: 2/3/2019.     FINDINGS:  The patient is now extubated. There are new bilateral  infiltrates. There is dense consolidation at the left lung base. The  inspiration is not as deep on the current study. Heart size is upper  limits of normal. There is mild blunting of the costophrenic angles  bilaterally.       Impression:       1. Hypoventilation. Patient extubated.  2. Dense consolidation in the left lung base consistent with known  pneumonia.  3. Worsening bilateral perihilar infiltrates with indistinct vessels  centrally. This is concerning for pulmonary edema.  4. Mild blunting of the costophrenic angles. There may be small pleural  effusions.        This report was finalized on 02/04/2019 07:20 by Dr. Dexter Ernandez MD.             Intake/Output Summary (Last 24 hours) at 2/4/2019 0929  Last data filed at 2/4/2019 0741  Gross per 24 hour   Intake 2426 ml   Output 1475 ml   Net 951 ml       Physical Exam  Constitutional: Acutely ill-appearing 84-year-old  gentleman currently on BiPAP without sedation.  He is cachectic. No family currently present. Seen and discussed with his nurse,  Tahmina.   Head: Normocephalic and atraumatic.   Eyes: Conjunctivae are normal. Pupils are sluggish bilaterally.  Both pupils are in mid dilation at 4 mm.  No pupillary response on the right.    Neck: Neck supple. No JVD present.  BiPAP mask in place.  Cardiovascular: Normal rate, regular rhythm, normal heart sounds and intact distal pulses. Exam reveals no gallop and no friction rub. No murmur heard.  Pulmonary/Chest: Bibasilar rales noted.  Abdominal: Soft. Bowel sounds are normal. He exhibits no distension. There is no tenderness. There is no rebound.   Genitourinary Comments: Perez catheter  present.  Placed 2/2.   Musculoskeletal: Normal range of motion. He exhibits no edema, tenderness or deformity.   Right femoral central venous catheter placed by Dr. Carias in the emergency  department on 2/2.    Neurological: He displays normal reflexes. No cranial nerve deficit. He exhibits normal muscle tone.  Responds to tactile stimulus by moaning.   Skin: Skin is warm, dry. Capillary refill less than 2 seconds. No mottling.       Results Review:  I have reviewed the labs, radiology results, and diagnostic studies since my last progress note and made treatment changes reflective of the results.   I have reviewed the current medications.    Assessment/Plan     Active Hospital Problems    Diagnosis   • **Acute respiratory failure with hypoxia (CMS/HCC)   • Hypokalemia   • Hypophosphatemia   • Hypomagnesemia   • Severe sepsis (CMS/HCC)   • Shock, septic (CMS/HCC)   • Bilateral pneumonia   • Lactic acidosis   • Leukocytosis   • Sinus tachycardia   • Elevated troponin   • Acute renal failure (ARF) (CMS/HCC)   • Hypernatremia   • Acute UTI (urinary tract infection)       PLAN:  Discontinue Zosyn and vancomycin  Rocephin 2 g every 24 hours based on sensitivities to Klebsiella and Haemophilus  Discontinue lactated Ringer's  Normal saline 1 L bolus  Normal saline at 100 cc/h  Discontinue femoral venous central line  Insert PICC line    Dillan Tapia DO   02/04/19   9:29 AM     Approximately 40 minutes of critical care time were spent managing the patient exclusive of billable procedures.

## 2019-02-04 NOTE — NURSING NOTE
02/04/19 1050   Wound 02/02/19 1724 Left lateral hip abrasion   Date first assessed/Time first assessed: 02/02/19 1724   Present On Admission : yes;picture taken  Side: Left  Orientation: lateral  Location: hip  Type: abrasion   Dressing Appearance open to air   Closure None   Base pink   Red (%), Wound Tissue Color 100   Periwound dry;intact   Periwound Temperature warm   Periwound Skin Turgor soft   Drainage Amount none   Wound 02/02/19 1726 Left posterior;lateral elbow skin tear   Date first assessed/Time first assessed: 02/02/19 1726   Side: Left  Orientation: posterior;lateral  Location: elbow  Type: skin tear   Dressing Appearance dried drainage   Closure None   Base red/granulating   Red (%), Wound Tissue Color 100   Periwound warm;intact   Periwound Temperature warm   Periwound Skin Turgor soft   Edges irregular   Wound Length (cm) 2.2 cm   Wound Width (cm) 1.5 cm   Wound Depth (cm) 0.1 cm   Drainage Characteristics/Odor serosanguineous   Drainage Amount scant   Dressing Care, Wound dressing changed;non-adherent;gauze   Wound 02/02/19 1727 medial coccyx   Date first assessed/Time first assessed: 02/02/19 1727   Present On Admission : yes;picture taken  Orientation: medial  Location: coccyx  Stage, Pressure Injury: deep tissue injury;Stage 2   Dressing Appearance open to air   Base purple   Periwound excoriated;pink;redness;blanchable;non-blanchable   Periwound Temperature warm   Periwound Skin Turgor soft   Edges irregular   Wound Length (cm) 1.4 cm   Wound Width (cm) 3.2 cm   Drainage Amount none   Dressing Care, Wound open to air   Periwound Care, Wound barrier ointment applied   Wound 02/02/19 1736 medial thoracic spine other (see comments)   Date first assessed/Time first assessed: 02/02/19 1736   Present On Admission : yes;picture taken  Orientation: medial  Location: thoracic spine  Type: (c) other (see comments)   Base pink   Periwound blanchable;redness   Periwound Temperature warm   Periwound  Skin Turgor soft   Drainage Amount none   Dressing Care, Wound dressing applied  (as a barrier )   Wound 02/02/19 1720 Right anterior hip pressure injury   Date first assessed/Time first assessed: 02/02/19 1720   Present On Admission : yes;picture taken  Side: Right  Orientation: anterior  Location: hip  Type: pressure injury  Stage, Pressure Injury: Stage 1   Base pink   Periwound blanchable;pink;redness   Periwound Temperature warm   Periwound Skin Turgor soft   Edges irregular   Wound Length (cm) 2.5 cm   Wound Width (cm) 3.5 cm   Drainage Amount none   Dressing Care, Wound dressing applied;foam     Patient presents with multiple blanchable and non-blanchable reddened areas.  Per RN, patient has had a recent 30lb weight loss.  He has multiple bony prominences prone to pressure injuries.  Suggested turning at least q2h.  Have ordered a dolphin mattress.  Applied foam dressings on spine and right hip to add barrier.  Barrier cream on coccyx and bilateral buttocks due to excoriation and deep tissue injury.  Skin tear on left upper posterior arm.  Covered with vaseline gauze and wrapped with Kerlix.  Will continue to follow.    Pictures taken by nursing staff:     Right hip      Left hip      Left upper arm:       Coccyx:

## 2019-02-05 NOTE — PLAN OF CARE
Problem: Patient Care Overview  Goal: Plan of Care Review   02/04/19 1917   Coping/Psychosocial   Plan of Care Reviewed With other (see comments)  (nursing)   Plan of Care Review   Progress declining   OTHER   Outcome Summary NPO day 2. Pt with severe malnutrion with weight loss of ~19.3% over the past year. Pt comfort care at this time. Follow per protocol.

## 2019-02-05 NOTE — PROGRESS NOTES
Continued Stay Note  DEWAYNE Michael     Patient Name: Jb Verde  MRN: [id number]  Today's Date: 2/5/2019    Admit Date: 2/2/2019    Discharge Plan     Row Name 02/05/19 0732       Plan    Plan  comfort care    Patient/Family in Agreement with Plan  yes    Plan Comments  Noted patient is now comfort care with orders to move to medical floor.  Patient's daughter was caring for patient at home prior to admission.  Patient is admitted under VA benefits and likely has Medicare.  SNF with hospice would be an option with the VA or palliative with Medicare.          Discharge Codes    No documentation.             CARMELINA Auguste

## 2019-02-05 NOTE — PROGRESS NOTES
Continued Stay Note   Fernanda     Patient Name: Jb Verde  MRN: [id number]  Today's Date: 2/5/2019    Admit Date: 2/2/2019    Discharge Plan     Row Name 02/05/19 1606       Plan    Plan Comments  WENT TO PT ROOM TO FOLLOW UP WITH PT DTR REGARDING DC PLAN. DTR IS NOT IN ROOM, RN STATES DTR HAS LEFT. UNABLE TO REACH OVER PHONE. WILL TRY AGAIN TOMORROW.         Discharge Codes    No documentation.             KIRAN Gomez

## 2019-02-05 NOTE — PROGRESS NOTES
Malnutrition Severity Assessment    Patient Name:  Jb Verde  YOB: 1934  MRN: [id number]  Admit Date:  2/2/2019    Patient meets criteria for : Severe malnutrition    Comments:  Severe Malnutrition in context of chronic illness related to inadequate protein energy and calorie intake with increased protein needs in setting of multiple wound areas noted as evidenced by decreased oral intake at SNF; significant weight loss of 19.3 % in one year with severe subcutaneous fat loss(orbital fat pads) and muscle wasting (severe to temporalis,clavicle region,deltoids,and thigh/patellar region); edema, dry flaky skin, and generalized weakness.    If in agreement please attest.    Malnutrition Type: Chronic Illness Malnutrition     Malnutrition Type (last 8 hours)      Malnutrition Severity Assessment     Row Name 02/04/19 1835       Malnutrition Severity Assessment    Malnutrition Type  Chronic Illness Malnutrition    Row Name 02/04/19 1835       Physical Signs of Malnutrition (Chronic)    Muscle Wasting  Severe [place] region severe; Clavicle region severe; Shoulder Acromion Process  moderate; Thigh/Patellar region severe    Fat Loss  Severe Orbital Region severe; Buccal Fat Pads moderate    Fluid Accumulation  -- edema    Secondary Physical Signs  Present (comment) generalized weakness; skin dry,flaky    Row Name 02/04/19 1835       Weight Status (Chronic)    BMI  -- BMI 20    %IBW  -- 93%    %UBW  Mod (75-85%)    Weight Loss  Severe (>20% / 1 yr) 19.3% since 1/16/2018    Row Name 02/04/19 1835       Energy Intake Status (Chronic)    Energy Intake  Severe (< or equal to 50% / > or equal to 1 mo)    Row Name 02/04/19 1835       Criteria Met (Must meet criteria for severity in at least 2 of these categories: M Wasting, Fat Loss, Fluid, Secondary Signs, Wt. Status, Intake)    Patient meets criteria for   Severe malnutrition        Electronically signed by:  Nya Grubbs MS,RDN,ABRAM  02/04/19 7:05 PM

## 2019-02-05 NOTE — PLAN OF CARE
Problem: Patient Care Overview  Goal: Plan of Care Review  Outcome: Ongoing (interventions implemented as appropriate)   02/05/19 1039   Coping/Psychosocial   Plan of Care Reviewed With patient;other (see comments)  (No evidence of learning )   Plan of Care Review   Progress (Eval)   OTHER   Outcome Summary Clinical bedside swallow eval completed. Pt was noted to have open mandibular posture at time of ST arrival. Dried oral mucosa. Lethargic, with minimal improvement in alertness, despite maximal, multi-modal cues. Pt was provided labial ice chip stimulation across multiple instances. Cued for lingual protrusion, yet tongue remained in retracted position, even with stim and oral care via water dampened and peroxide dampened toothette (from oral care kit at bedside). Some mild labial movement in response to ice chip stimulation, yet no oral manipulation of thin liquid residue. Congested cough noted x1, with increased congestion noted in breath sounds. Unable to elicit a swallow at that time. Therefore, pt not appropriate for PO trials. It must be noted that gag reflex was present during oral care, though weak. High risk for aspiration with PO intake at this time. RECOMMENDATIONS: NPO, yet considering now comfort measures status, decision for allowance for PO for comfort can be left to MD and family (no family present at time of ST eval to educate risks); Oral care at least 3x per day, PRN via oral care kits; Ok to swab water dampened toothette in the oral cavity occasionally for pt comfort, yet this should only be done FOLLOWING ORAL CARE; RN to monitor for increased congestion. ST to monitor PRN, with plan to educate family on risks for aspiration if PO for comfort is provided, including current risk for aspiration considering that pt does have PO med (which would be safest crushed, if safe to crush, in pudding/applesauce, though ultimately not felt safe from aspiration standpoint). Thanks!

## 2019-02-05 NOTE — PROGRESS NOTES
Continued Stay Note   Fernanda     Patient Name: Jb Verde  MRN: [id number]  Today's Date: 2/5/2019    Admit Date: 2/2/2019    Discharge Plan     Row Name 02/05/19 0902       Plan    Plan  SNF placement with hospice    Patient/Family in Agreement with Plan  other (see comments)    Plan Comments  Social service consult received regarding SNF placement with hospice.  SW contacted patient's daughter, Hanh Verde, via telephone and woke her up.  SW advised of discharge options, VA SNF placement at a [facility] contracted facility, ([place] or [facility]), or SNF placement with palliative/comfort at a skilled facility with Medicare.  Daughter advised patient has Medicare A only.  Daughter also advised she did not like the facility in [place] and would not agree to that facility.  Daughter then stated she had just woke up and needed to get herself together and call  back.  Contact number provided for daughter to call SW back.    Row Name 02/05/19 0732       Plan    Plan  comfort care    Patient/Family in Agreement with Plan  yes    Plan Comments  Noted patient is now comfort care with orders to move to medical floor.  Patient's daughter was caring for patient at home prior to admission.  Patient is admitted under VA benefits and likely has Medicare.  SNF with hospice would be an option with the VA or palliative with Medicare.          Discharge Codes    No documentation.             CARMELINA Auguste

## 2019-02-05 NOTE — PROGRESS NOTES
[facility] Medicine Services  INPATIENT PROGRESS NOTE    Length of Stay: 3  Date of Admission: 2/2/2019  Primary Care Physician: Tamiko Vizcaino MD    Subjective     Chief Complaint:     Septic shock    HPI     The patient remains on comfort care measures.  There are no rooms available on the floor so the patient has remained in the CCU overnight.  IV fluids have been discontinued and the patient seems to be comfortable at present.  He continues to respond to stimulation by moaning.  He displays no intentional movement and cannot follow commands.    Review of Systems     All pertinent negatives and positives are as above. All other systems have been reviewed and are negative unless otherwise stated.     Objective    Temp:  [97.6 °F (36.4 °C)-98.2 °F (36.8 °C)] 98.2 °F (36.8 °C)  Heart Rate:  [58-92] 79  Resp:  [11-25] 18  BP: ()/(12-90) 120/75  FiO2 (%):  [35 %] 35 %    Lab Results (last 24 hours)     Procedure Component Value Units Date/Time    Blood Culture - Blood, Leg, Right [[id number]] Collected:  02/02/19 1453    Specimen:  Blood from Leg, Right Updated:  02/04/19 1615     Blood Culture No growth at 2 days    POC Glucose Once [[id number]]  (Abnormal) Collected:  02/04/19 1157    Specimen:  Blood Updated:  02/04/19 1208     Glucose 136 mg/dL      Comment: : [id number] Eyad hunterMeter ID: [id number]       POC Glucose Once [[id number]]  (Abnormal) Collected:  02/04/19 1133    Specimen:  Blood Updated:  02/04/19 1205     Glucose 52 mg/dL      Comment: : [id number] Eyad hunterMeter ID: [id number]       POC Glucose Once [[id number]]  (Abnormal) Collected:  02/04/19 1112    Specimen:  Blood Updated:  02/04/19 1123     Glucose 54 mg/dL      Comment: : [id number] Eyad hunterMeter ID: [id number]             Imaging Results (last 24 hours)     Procedure Component Value Units Date/Time    XR Chest 1 View [[id number]] Collected:  02/04/19 1523     Updated:   02/04/19 1527    Narrative:       EXAMINATION:   XR CHEST 1 VW-  2/4/2019 3:23 PM CST     HISTORY: PIC catheter     Frontal upright radiograph of the chest 2/4/2019 12:48 PM CST     COMPARISON: February 3, 2019.     FINDINGS:   Interstitial infiltrates now present in the right lung base. Left  diaphragms indistinct. This most likely represents atelectasis left  lower lobe.. Cardiac silhouette is normal. Left PIC catheters present a  cardiac monitoring lead projects over the distal tip of the PICC  catheter but appears satisfactorily position..      The osseous structures and surrounding soft tissues demonstrate no acute  abnormality.       Impression:       1. New interstitial infiltrate right lung base. This may be atelectasis  or possibly an inflammatory process.  2. Left diaphragm is indistinct this most likely represents atelectasis  left lower lobe.        This report was finalized on 02/04/2019 15:24 by Dr. Julius Truong MD.             Intake/Output Summary (Last 24 hours) at 2/5/2019 0854  Last data filed at 2/5/2019 0758  Gross per 24 hour   Intake 2005.8 ml   Output 850 ml   Net 1155.8 ml       Physical Exam   Constitutional: He appears well-developed. He appears cachectic.   HENT:   Head: Normocephalic and atraumatic.   Cardiovascular: Normal rate and regular rhythm.   Pulmonary/Chest: Effort normal. He has rales ( Bilaterally).   Abdominal: Soft. Bowel sounds are normal.   Neurological: He is unresponsive.   Skin: Skin is warm and dry.       Results Review:  I have reviewed the labs, radiology results, and diagnostic studies since my last progress note and made treatment changes reflective of the results.   I have reviewed the current medications.    Assessment/Plan     Active Hospital Problems    Diagnosis   • **Acute respiratory failure with hypoxia (CMS/HCC)   • Hypokalemia   • Hypophosphatemia   • Hypomagnesemia   • Severe sepsis (CMS/HCC)   • Shock, septic (CMS/HCC)   • Bilateral pneumonia   •  Lactic acidosis   • Leukocytosis   • Sinus tachycardia   • Elevated troponin   • Acute renal failure (ARF) (CMS/HCC)   • Hypernatremia   • Acute UTI (urinary tract infection)       PLAN:  Continue comfort measures care  Transfer to the medical surgical floor when a bed is available   consulted to pursue SNF placement with hospice care    Dillan Tapia DO   02/05/19   8:54 AM

## 2019-02-05 NOTE — PROGRESS NOTES
"Palliative Care Daily Progress Note     support for patient/family and comfort care    Code Status:   Code Status and Medical Interventions:   Ordered at: 02/04/19 1442     Level Of Support Discussed With:    Next of Kin (If No Surrogate)     Code Status:    No CPR     Medical Interventions (Level of Support Prior to Arrest):    Comfort Measures     Comments:    patient's daughter      Advanced Directives: Advance Directive Status: Patient does not have advance directive   Goals of Care: Ongoing.     S: Medical record reviewed. Events noted.  Patient resting comfortably in bed without apparent distress.  Received morphine 5 mg sublingual ×1 dose, 5 mg oral morphine equivalent over the last 24 hours.  No family at bedside.  Remains in the critical care unit and awaiting open bed on regular medical floor.   working towards discharge plan.    ROS:   Unable to perform ROS: acuity of condition (dementia)     O:       Intake/Output Summary (Last 24 hours) at 2/5/2019 1321  Last data filed at 2/5/2019 0758  Gross per 24 hour   Intake 226.2 ml   Output 550 ml   Net -323.8 ml       Diagnostics: Reviewed    Patient Active Problem List   Diagnosis   • Acute respiratory failure with hypoxia (CMS/HCC)   • Severe sepsis (CMS/HCC)   • Shock, septic (CMS/HCC)   • Bilateral pneumonia   • Lactic acidosis   • Leukocytosis   • Sinus tachycardia   • Elevated troponin   • Acute renal failure (ARF) (CMS/HCC)   • Hypernatremia   • Acute UTI (urinary tract infection)   • Hypokalemia   • Hypophosphatemia   • Hypomagnesemia       Physical Exam:    /77   Pulse 90   Temp 98.1 °F (36.7 °C) (Axillary)   Resp 18   Ht 162.6 cm (64\")   Wt 55.1 kg (121 lb 6.4 oz)   SpO2 94%   BMI 20.84 kg/m²   Physical Exam   Constitutional: He appears cachectic. He has a sickly appearance. He appears ill. He appears distressed. Nasal cannula in place.   HENT:   Head: Normocephalic and atraumatic.   Cardiovascular: Normal rate, regular " rhythm and normal heart sounds. Exam reveals decreased pulses.   Doppler bilateral lower extremity pulses. Pulmonary/Chest: Effort normal and breath sounds normal.   Abdominal: Bowel sounds are decreased.   cachetic   Genitourinary:   Genitourinary Comments: sesay   Musculoskeletal: He exhibits deformity.   Right hand deformity. Contractures.   Neurological: He is alert. He is disoriented.   Skin:   Multiple documented wounds.   Psychiatric: His mood appears calm and relaxed, moans with mild noxious stimulus. Cognition and memory are impaired.            Patient status: Disease state: Controlled with current treatments.  Functional status: Palliative Performance Scale Score: Performance 10% based on the following measures: Ambulation: Totally bed bound, Activity and Evidence of Disease: Unable to do any work, extensive evidence of disease, Self-Care: Total care required,  Intake: Mouth care only, LOC: Drowsy or comatose   Nutritional status: Albumin 2.30. Body mass index is 20.84 kg/m². Refuses oral intake per family, spits out anything offered x 1 month.  Screening Status/Interventions  Psychosocial Needs: neg  Spiritual Needs: neg  Goals of Care/ACP: pos  Goals of Care/ACP Intervened: yes   Palliative Care Acuity  Psychosocial Acuity: normal complexity  Spiritual Acuity: normal complexity  Family support: The patient receives support from his daughter..  POA/Healthcare surrogate-no advance directive on file     Impression/Problem List:     1.  Alzheimer's dementia-FAST 7F  2.  Acute respiratory failure with hypoxia  3.  Severe sepsis with septic shock  4.  Bilateral pneumonia  5.  Urinary tract infection  6.  Severe protein calorie malnutrition  7.  Dysphagia  8.  Debility-bedbound     Recommendations/Plan:  1. plan: Goals of care include CODE STATUS NO CPR\Comfort Measures.   -Family meeting with patient's daughter on 02/04/2019 at which time she elected to pursue full comfort measures.   has been  in contact with patient's daughter to determine a discharge plan to include skilled nursing facility with hospice\be a benefit or skilled nursing facility with palliative and Medicare benefit.       2.  Comfort care  -Continue Morphine concentrated solution sublingual as needed.  -Continue Ativan concentrated solution 0.5 mg sublingual every 8 hours and as needed anxiety.  Patient did not require additional as needed dosing overnight.    Thank you for allowing us to participate in patient's plan of care. Palliative Care Team will continue to follow patient.     Time spent: 35 minutes spent reviewing medical and medication records, assessing and examining patient, discussing with family, answering questions, providing some guidance about a plan and documentation of care, and coordinating care with other healthcare members, with > 50% time spent face to face.       Jodie Corona, APRN  2/5/2019

## 2019-02-05 NOTE — THERAPY EVALUATION
Acute Care - Speech Language Pathology   Swallow Initial Evaluation [facility]     Patient Name: Jb Verde  : 1934  MRN: [id number]  Today's Date: 2019               Admit Date: 2019     Clinical bedside swallow eval completed. Pt was noted to have open mandibular posture at time of ST arrival. Dried oral mucosa. Lethargic, with minimal improvement in alertness, despite maximal, multi-modal cues. Pt was provided labial ice chip stimulation across multiple instances. Cued for lingual protrusion, yet tongue remained in retracted position, even with stim and oral care via water dampened and peroxide dampened toothette (from oral care kit at bedside). Some mild labial movement in response to ice chip stimulation, yet no oral manipulation of thin liquid residue. Congested cough noted x1, with increased congestion noted in breath sounds. Unable to elicit a swallow at that time. Therefore, pt not appropriate for PO trials. It must be noted that gag reflex was present during oral care, though weak. High risk for aspiration with PO intake at this time. RECOMMENDATIONS: NPO, yet considering now comfort measures status, decision for allowance for PO for comfort can be left to MD and family (no family present at time of ST eval to educate risks); Oral care at least 3x per day, PRN via oral care kits; Ok to swab water dampened toothette in the oral cavity occasionally for pt comfort, yet this should only be done FOLLOWING ORAL CARE; RN to monitor for increased congestion. ST to monitor PRN, with plan to educate family on risks for aspiration if PO for comfort is provided, including current risk for aspiration considering that pt does have PO med (which would be safest crushed, if safe to crush, in pudding/applesauce, though ultimately not felt safe from aspiration standpoint). Thanks!   Yessenia Aragon, CCC-SLP 2019 10:45 AM    Visit Dx:     ICD-10-CM ICD-9-CM   1. Respiratory distress R06.03 786.09   2.  Sepsis, due to unspecified organism (CMS/[facility]) A41.9 038.9     995.91   3. DEMETRIS (acute kidney injury) (CMS/[facility]) N17.9 584.9   4. Metabolic acidosis E87.2 276.2   5. Oropharyngeal dysphagia R13.12 787.22     Patient Active Problem List   Diagnosis   • Acute respiratory failure with hypoxia (CMS/[facility])   • Severe sepsis (CMS/[facility])   • Shock, septic (CMS/[facility])   • Bilateral pneumonia   • Lactic acidosis   • Leukocytosis   • Sinus tachycardia   • Elevated troponin   • Acute renal failure (ARF) (CMS/[facility])   • Hypernatremia   • Acute UTI (urinary tract infection)   • Hypokalemia   • Hypophosphatemia   • Hypomagnesemia     Past Medical History:   Diagnosis Date   • Acid reflux    • Allergic rhinitis    • Arthritis    • Monique esophagus    • Carotid atherosclerosis    • Cognitive impairment    • Delirium    • Folic acid deficiency    • Hyperlipemia    • Hypertension    • Kidney stone    • Nail dystrophy    • Osteoarthritis of knee    • Peripheral vascular disease (CMS/[facility])    • Polyp of colon    • Transient cerebral ischemia    • Traumatic amputation of thumb    • Urinary tract infection    • Vitamin B12 deficiency    • Vitamin D deficiency      Past Surgical History:   Procedure Laterality Date   • CYSTOSCOPY URETEROSCOPY LASER LITHOTRIPSY Left 12/5/2016    Procedure: CYSTOSCOPY URETEROSCOPY LASER LITHOTRIPSY;  Surgeon: Rolf Stanley MD;  Location: [facility] OR;  Service:    • CYSTOSCOPY W/ URETERAL STENT PLACEMENT Left 11/16/2016    Procedure: CYSTOSCOPY WITH LEFT DOUBLE J STENT INSERTION WITH LEFT EXTRACORPOREAL SHOCKWAVE LITHOTRIPSY;  Surgeon: Rolf Stanley MD;  Location: [facility] OR;  Service:    • GALLBLADDER SURGERY     • KIDNEY STONE SURGERY     • OTHER SURGICAL HISTORY Right 1958    R/T TRAUMATIC INJURY AT WORK         SWALLOW EVALUATION (last 72 hours)      SLP Adult Swallow Evaluation     Row Name 02/05/19 0930                   Rehab Evaluation    Document Type  evaluation  -TM        Subjective Information   weakness;fatigue  -TM        Patient Observations  lethargic;decreased LOC  -TM        Patient/Family Observations  No family present at time of eval.  -TM        Patient Effort  fair  -TM           General Information    Patient Profile Reviewed  yes  -TM        Pertinent History Of Current Problem  Acute respiratory failure, sepsis, renal failure. CXR 02/04/2019 revealed New interstitial infiltrate right lung base  -TM        Current Method of Nutrition  NPO  -TM        Precautions/Limitations, Vision  other (see comments) Unable to determine, unable to open eyes at time of eval.l  -TM        Precautions/Limitations, Hearing  WFL;for purposes of eval  -TM        Prior Level of Function-Communication  cognitive-linguistic impairment  -TM        Prior Level of Function-Swallowing  no diet consistency restrictions  -TM        Plans/Goals Discussed with  patient;other (see comments) No evidence of learning   -TM        Barriers to Rehab  medically complex;previous functional deficit;cognitive status;physical barrier  -TM        Patient's Goals for Discharge  patient did not state  -TM           Pain Assessment    Additional Documentation  Pain Scale: FACES Pre/Post-Treatment (Group)  -TM           Pain Scale: FACES Pre/Post-Treatment    Pain: FACES Scale, Pretreatment  2-->hurts little bit  -TM        Pain: FACES Scale, Post-Treatment  2-->hurts little bit  -TM           Oral Motor and Function    Dentition Assessment  missing teeth;teeth are in poor condition  -TM        Secretion Management  dried secretions in oral cavity  -TM        Mucosal Quality  dry;cracked  -TM        Volitional Swallow  unable to elicit  -TM        Volitional Cough  reduced respiratory support;other (see comments) As judged during involuntary cough   -TM           Oral Musculature and Cranial Nerve Assessment    Oral Motor General Assessment  generalized oral motor weakness;mandibular impairment;oral labial or buccal impairment;lingual  impairment;vocal impairment;other (see comments) As determined during indirect tasks, unable to follow direct  -TM        Mandibular Impairment Detail, Cranial Nerve V (Trigeminal)  reduced mandibular ROM;reduced strength bilaterally  -TM        Oral Labial or Buccal Impairment, Detail, Cranial Nerve VII (Facial):  reduced ROM;reduced strength bilaterally  -TM        Lingual Impairment, Detail. Cranial Nerves IX, XII (Glossopharyngeal and Hypoglossal)  reduced lingual ROM;reduced strength  -TM        Vocal Impairment, Detail. Cranial Nerve X (Vagus)  vocal quality abnormality (see comments);other (see comments) Hoarse, weak   -TM           General Eating/Swallowing Observations    Respiratory Support Currently in Use  nasal cannula  -TM           Respiratory    Respiratory Status  WFL  -TM           Clinical Swallow Eval    Oral Prep Phase  impaired  -TM        Oral Transit  impaired  -TM        Oral Residue  impaired  -TM        Pharyngeal Phase  suspected pharyngeal impairment  -TM        Esophageal Phase  -- Unable to determine, as no direct PO trials were given.  -TM        Clinical Swallow Evaluation Summary  Clinical bedside swallow eval completed. Pt was noted to have open mandibular posture at time of ST arrival. Dried oral mucosa. Lethargic, with minimal improvement in alertness, despite maximal, multi-modal cues. Pt was provided labial ice chip stimulation across multiple instances. Cued for lingual protrusion, yet tongue remained in retracted position, even with stim and oral care via water dampened and peroxide dampened toothette (from oral care kit at bedside). Some mild labial movement in response to ice chip stimulation, yet no oral manipulation of thin liquid residue. Congested cough noted x1, with increased congestion noted in breath sounds. Unable to elicit a swallow at that time. Therefore, pt not appropriate for PO trials. It must be noted that gag reflex was present during oral care, though  weak. High risk for aspiration with PO intake at this time.   -TM           Oral Prep Concerns    Oral Prep Concerns  incomplete bolus preparation  -TM        Incomplete Bolus Preparation  thin;other (see comments) Ice residue   -TM           Oral Transit Concerns    Oral Transit Concerns  unable to initiate oral transit  -TM           Oral Residue Concerns    Oral Residue Concerns  diffuse residue throughout oral cavity  -TM        Diffuse Residue Throughout Oral Cavity  thin;other (see comments) Ice residue  -TM           Pharyngeal Phase Concerns    Pharyngeal Phase Concerns  cough  -TM        Cough  thin;other (see comments) Ice residue  -TM           Clinical Impression    SLP Swallowing Diagnosis  profound;oral dysfunction;pharyngeal dysfunction  -TM        Functional Impact  risk of aspiration/pneumonia;risk of malnutrition;risk of dehydration  -TM        Rehab Potential/Prognosis, Swallowing  re-evaluate goals as necessary  -TM        Swallow Criteria for Skilled Therapeutic Interventions Met  demonstrates skilled criteria  -TM           Recommendations    Therapy Frequency (Swallow)  PRN  -TM        Predicted Duration Therapy Intervention (Days)  until discharge  -TM        SLP Diet Recommendation  NPO;other (see comments) Yet considering comfort, ST cannot ensure safety with PO.  -TM        Recommended Precautions and Strategies  upright posture during/after eating;small bites of food and sips of liquid  -TM        SLP Rec. for Method of Medication Administration  meds via alternate route Yet, considering comfort, MD to determine, knowing risks   -TM        Monitor for Signs of Aspiration  yes;cough;gurgly voice;throat clearing;pneumonia;right lower lobe infiltrates  -TM        Anticipated Dischage Disposition  extended care facility  -TM           Swallow Goals (SLP)    Oral Nutrition/Hydration Goal Selection (SLP)  oral nutrition/hydration, SLP goal 1  -TM           Oral Nutrition/Hydration Goal 1 (SLP)     Oral Nutrition/Hydration Goal 1, SLP  Pt will tolerate PO for comfort without increased lung congestion.   -TM        Time Frame (Oral Nutrition/Hydration Goal 1, SLP)  by discharge  -TM        Barriers (Oral Nutrition/Hydration Goal 1, SLP)  Medically complex  -TM        Progress/Outcomes (Oral Nutrition/Hydration Goal 1, SLP)  goal ongoing  -TM          User Key  (r) = Recorded By, (t) = Taken By, (c) = Cosigned By    Initials Name Effective Dates    TM Yessenia Aragon CCC-SLP 08/02/16 -           EDUCATION  The patient has been educated in the following areas:   Dysphagia (Swallowing Impairment).    SLP Recommendation and Plan  SLP Swallowing Diagnosis: profound, oral dysfunction, pharyngeal dysfunction  SLP Diet Recommendation: NPO, other (see comments)(Yet considering comfort, ST cannot ensure safety with PO.)  Recommended Precautions and Strategies: upright posture during/after eating, small bites of food and sips of liquid     Monitor for Signs of Aspiration: yes, cough, gurgly voice, throat clearing, pneumonia, right lower lobe infiltrates     Swallow Criteria for Skilled Therapeutic Interventions Met: demonstrates skilled criteria  Anticipated Dischage Disposition: extended care facility  Rehab Potential/Prognosis, Swallowing: re-evaluate goals as necessary  Therapy Frequency (Swallow): PRN  Predicted Duration Therapy Intervention (Days): until discharge       Plan of Care Reviewed With: patient, other (see comments)(No evidence of learning )  Plan of Care Review  Plan of Care Reviewed With: patient, other (see comments)(No evidence of learning )  Progress: (Eval)  Outcome Summary: Clinical bedside swallow eval completed. Pt was noted to have open mandibular posture at time of ST arrival. Dried oral mucosa. Lethargic, with minimal improvement in alertness, despite maximal, multi-modal cues. Pt was provided labial ice chip stimulation across multiple instances. Cued for lingual protrusion, yet tongue  remained in retracted position, even with stim and oral care via water dampened and peroxide dampened toothette (from oral care kit at bedside). Some mild labial movement in response to ice chip stimulation, yet no oral manipulation of thin liquid residue. Congested cough noted x1, with increased congestion noted in breath sounds. Unable to elicit a swallow at that time. Therefore, pt not appropriate for PO trials. It must be noted that gag reflex was present during oral care, though weak. High risk for aspiration with PO intake at this time. RECOMMENDATIONS: NPO, yet considering now comfort measures status, decision for allowance for PO for comfort can be left to MD and family (no family present at time of ST eval to educate risks); Oral care at least 3x per day, PRN via oral care kits; Ok to swab water dampened toothette in the oral cavity occasionally for pt comfort, yet this should only be done FOLLOWING ORAL CARE; RN to monitor for increased congestion. ST to monitor PRN, with plan to educate family on risks for aspiration if PO for comfort is provided, including current risk for aspiration considering that pt does have PO med (which would be safest crushed, if safe to crush, in pudding/applesauce, though ultimately not felt safe from aspiration standpoint). Thanks!     SLP GOALS     Row Name 02/05/19 3345             Oral Nutrition/Hydration Goal 1 (SLP)    Oral Nutrition/Hydration Goal 1, SLP  Pt will tolerate PO for comfort without increased lung congestion.   -TM      Time Frame (Oral Nutrition/Hydration Goal 1, SLP)  by discharge  -TM      Barriers (Oral Nutrition/Hydration Goal 1, SLP)  Medically complex  -TM      Progress/Outcomes (Oral Nutrition/Hydration Goal 1, SLP)  goal ongoing  -TM        User Key  (r) = Recorded By, (t) = Taken By, (c) = Cosigned By    Initials Name Provider Type    TM Yessenia Aragon CCC-SLP Speech and Language Pathologist             Time Calculation:   Time Calculation- SLP      Row Name 02/05/19 1043             Time Calculation- SLP    SLP Start Time  0930  -TM      SLP Stop Time  1015  -TM      SLP Time Calculation (min)  45 min  -TM      SLP Received On  02/05/19  -      SLP Goal Re-Cert Due Date  02/15/19  -        User Key  (r) = Recorded By, (t) = Taken By, (c) = Cosigned By    Initials Name Provider Type     Yessenia Aragon, CCC-SLP Speech and Language Pathologist          Therapy Charges for Today     Code Description Service Date Service Provider Modifiers Qty    [id number]  ST EVAL ORAL PHARYNG SWALLOW 3 2/5/2019 Yessenia Aragon CCC-SLP GN 1               PINA Byrd  2/5/2019

## 2019-02-06 NOTE — PROGRESS NOTES
Continued Stay Note   [place]     Patient Name: Jb Verde  MRN: [id number]  Today's Date: 2019    Admit Date: 2019    Discharge Plan     Row Name 19 1603       Plan    Final Discharge Disposition Code  41 -  in medical facility    Final Note  PT  TODAY        Discharge Codes    No documentation.             KIRAN Gomez

## 2019-02-06 NOTE — PROGRESS NOTES
"Palliative Care Daily Progress Note     support for patient/family and comfort care    Code Status:   Code Status and Medical Interventions:   Ordered at: 02/04/19 1442     Level Of Support Discussed With:    Next of Kin (If No Surrogate)     Code Status:    No CPR     Medical Interventions (Level of Support Prior to Arrest):    Comfort Measures     Comments:    patient's daughter      Advanced Directives: Advance Directive Status: Patient does not have advance directive   Goals of Care: Ongoing.     S: Medical record reviewed. Events noted. Increased respiratory effort compared to yesterday, currently respirations 24/min with accessory muscle use, increased respiratory congestion, moans with minimal stimulus. Received morphine 5 mg sublingual x 5 doses, 25 mg oral morphine equivalent over the last 24 hours. Medication changes overnight by PM hospitalist.    ROS:   Unable to perform ROS: acuity of condition (dementia)     O:       Intake/Output Summary (Last 24 hours) at 2/6/2019 0905  Last data filed at 2/6/2019 0500  Gross per 24 hour   Intake 0 ml   Output 550 ml   Net -550 ml       Diagnostics: Reviewed    Patient Active Problem List   Diagnosis   • Acute respiratory failure with hypoxia (CMS/HCC)   • Severe sepsis (CMS/HCC)   • Shock, septic (CMS/HCC)   • Bilateral pneumonia   • Lactic acidosis   • Leukocytosis   • Sinus tachycardia   • Elevated troponin   • Acute renal failure (ARF) (CMS/HCC)   • Hypernatremia   • Acute UTI (urinary tract infection)   • Hypokalemia   • Hypophosphatemia   • Hypomagnesemia       Physical Exam:    /74 (BP Location: Right arm, Patient Position: Lying)   Pulse 101   Temp 97.8 °F (36.6 °C) (Axillary)   Resp 19   Ht 162.6 cm (64\")   Wt 55.1 kg (121 lb 6.4 oz)   SpO2 (!) 80%   BMI 20.84 kg/m²   Constitutional: He appears cachectic. He has a sickly appearance. He appears ill. He appears distressed. Nasal cannula in place.   HENT:   Head: Normocephalic and atraumatic. "   Cardiovascular: Normal rate, regular rhythm and normal heart sounds. Exam reveals decreased pulses.   Doppler bilateral lower extremity pulses. Pulmonary/Chest: Increased effort with congested breath breath sounds and accessory muscle use.   Abdominal: Bowel sounds are decreased.   cachetic   Genitourinary:   Genitourinary Comments: sesay   Musculoskeletal: He exhibits deformity.   Right hand deformity. Contractures.   Neurological: He is alert. He moan to minimal stimulus.   Skin:   Multiple documented wounds.   Psychiatric: He moans with mild noxious stimulus. Cognition and memory are impaired.            Patient status: Disease state: Controlled with current treatments.  Functional status: Palliative Performance Scale Score: Performance 10% based on the following measures: Ambulation: Totally bed bound, Activity and Evidence of Disease: Unable to do any work, extensive evidence of disease, Self-Care: Total care required,  Intake: Mouth care only, LOC: Drowsy or comatose   Nutritional status: Albumin 2.30. Body mass index is 20.84 kg/m². Refuses oral intake per family, spits out anything offered x 1 month.  Screening Status/Interventions  Psychosocial Needs: neg  Spiritual Needs: neg  Goals of Care/ACP: pos  Goals of Care/ACP Intervened: yes   Palliative Care Acuity  Psychosocial Acuity: normal complexity  Spiritual Acuity: normal complexity  Family support: The patient receives support from his daughter..  POA/Healthcare surrogate-no advance directive on file     Impression/Problem List:     1.  Alzheimer's dementia-FAST 7F  2.  Acute respiratory failure with hypoxia  3.  Severe sepsis with septic shock  4.  Bilateral pneumonia  5.  Urinary tract infection  6.  Severe protein calorie malnutrition  7.  Dysphagia  8.  Debility-bedbound     Recommendations/Plan:  1. plan: Goals of care include CODE STATUS NO CPR\Comfort Measures.   -Family meeting with patient's daughter on 02/04/2019 at which time she elected to  pursue full comfort measures.  -Will likely pass here.       2.  Comfort care  -Change morphine concentrated solution sublingual to scheduled and as needed.  -Change Ativan concentrated solution 1 mg sublingual every 8 hours and as needed anxiety.  Patient did require additional as needed dosing overnight.  -Add atropine drops for oral secretions. Continue scopolamine.       Thank you for allowing us to participate in patient's plan of care. Palliative Care Team will continue to follow patient.     Time spent: 35 minutes spent reviewing medical and medication records, assessing and examining patient, discussing with family, answering questions, providing some guidance about a plan and documentation of care, and coordinating care with other healthcare members, with > 50% time spent face to face.       Jodie Corona, APRN  2/6/2019

## 2019-02-06 NOTE — PROGRESS NOTES
Continued Stay Note   [place]     Patient Name: Jb Verde  MRN: [id number]  Today's Date: 2/6/2019    Admit Date: 2/2/2019    Discharge Plan     Row Name 02/06/19 1207       Plan    Plan Comments  PT IS TOTAL COMFORT CARE AND BED BOUND. PT IS NOT ABLE TO COMMUNICATE. NOTED PT WILL LIKELY PASS HER. PAIN/ANXIETY MEDS HAVE BEEN INCREASED. DTR IS NOT IN ROOM. WILL FOLLOW.         Discharge Codes    No documentation.             KIRAN Gomez

## 2019-02-06 NOTE — DISCHARGE SUMMARY
[facility] Medicine Services  DEATH SUMMARY       Date of Admission: 2/2/2019  Date of Death:  2/6/2019 at [id number]  Primary Care Physician: Tamiko Vizcaino MD    Presenting Problem/History of Present Illness:  Respiratory distress [R06.03]     Final Death Diagnoses:  Active Hospital Problems    Diagnosis   • **Acute respiratory failure with hypoxia (CMS/HCC)   • Hypokalemia   • Hypophosphatemia   • Hypomagnesemia   • Severe sepsis (CMS/HCC)   • Shock, septic (CMS/HCC)   • Bilateral pneumonia   • Lactic acidosis   • Leukocytosis   • Sinus tachycardia   • Elevated troponin   • Acute renal failure (ARF) (CMS/HCC)   • Hypernatremia   • Acute UTI (urinary tract infection)       Cause of death:  Respiratory failure with hypoxia secondary to  Bilateral pneumonia    Contributing conditions:  Septic shock  Acute renal failure  Urinary tract infection    Consults:   Pulmonology:  PCCM Problems  Severe metabolic acidosis  Acute respiratory failure, vent  Shock, possibly septic  Acute kidney injury  Severe dehydration  Severe protein calorie malnutrition        Plan of Treatment  Severe metabolic acidosis of unknown etiology.  Chest x-ray does not look impressive.  Bowel ischemia must be suspected.  We will await the next lactate level and if persistent high will order CT of the abdomen.     Bicarbonate drip has been ordered by me.  Watch out for deterioration in renal function.  Acute kidney injury noted.  May require renal consult if his lactate levels remains high.     Acute respiratory failure noted.  Ventilator settings adjusted.  Permit large minute ventilation as very severe acidosis.     Shock therefore on pressors.  Partly due to volume depletion.  Possibly also septic.  Antibiotics as ordered by you.  We will get fluids as well.     Severe protein calorie malnutrition and general poor health.  Agree with nutrition consult.      Palliative care:  Impression/Problem  List:     1.  Alzheimer's dementia-FAST 7F  2.  Acute respiratory failure with hypoxia  3.  Severe sepsis with septic shock  4.  Bilateral pneumonia  5.  Urinary tract infection  6.  Severe protein calorie malnutrition  7.  Dysphagia  8.  Debility-bedbound     Recommendations/Plan:  1. plan: Goals of care include To be determined.  Currently CODE STATUS CPR\full interventions.   -Planned family meeting with patient's daughter at 2 PM this afternoon.  @2 PM patient's daughter has elected to transition to comfort care.  Daughter is aware patient will has a terminal diagnosis of end-stage dementia and this hospitalization will likely result in the patient's death.  We briefly discussed discharge plans including home with hospice versus skilled nursing facility and further conversations will to be dependent on how patient progresses overnight.  Outcomes  Code Status After Consult: DNR/DNI  Number of Family Meetings: 1  Number of Days Until Referral Purpose Met/Improved: 1  Other Outcomes: code status clarified     2.  Comfort care  -Morphine concentrated solution sublingual as needed.  -Ativan concentrated solution 0.5 mg sublingual every 8 hours and as needed anxiety.     Thank you for this consult and allowing us to participate in patient's plan of care. Palliative Care Team will continue to follow patient.      Time spent: 105 minutes spent reviewing medical and medication records, assessing and examining patient, discussing with family, answering questions, providing some guidance about a plan and documentation of care, and coordinating care with other healthcare members, with > 50% time spent face to face.   60 minutes spent on advance care planning.     Jodie Corona, SONIYA      Pertinent Test Results:   Lab Results (last 7 days)     Procedure Component Value Units Date/Time    Blood Culture - Blood, Leg, Right [[id number]] Collected:  02/02/19 1453    Specimen:  Blood from Leg, Right Updated:  02/06/19 0914      Blood Culture No growth at 4 days    Blood Culture - Blood, Wrist, Left [[id number]]  (Abnormal)  (Susceptibility) Collected:  02/02/19 1453    Specimen:  Blood from Wrist, Left Updated:  02/06/19 1034     Blood Culture Haemophilus influenzae     Isolated from Pediatric Bottle     BETA LACTAMASE Positive     Gram Stain Gram negative bacilli    Narrative:       No anaerobic bottle collected.     Susceptibility      Haemophilus influenzae     DISK DIFFUSION     Cefotaxime Susceptible     Ciprofloxacin Susceptible     Trimethoprim + Sulfamethoxazole Resistant                    POC Glucose Once [[id number]]  (Abnormal) Collected:  02/04/19 1157    Specimen:  Blood Updated:  02/04/19 1208     Glucose 136 mg/dL      Comment: : [id number] Eyad hunterMeter ID: [id number]       POC Glucose Once [[id number]]  (Abnormal) Collected:  02/04/19 1133    Specimen:  Blood Updated:  02/04/19 1205     Glucose 52 mg/dL      Comment: : [id number] Eyad hunterMeter ID: [id number]       POC Glucose Once [[id number]]  (Abnormal) Collected:  02/04/19 1112    Specimen:  Blood Updated:  02/04/19 1123     Glucose 54 mg/dL      Comment: : [id number] hunterMeter ID: [id number]       Blood Culture ID, PCR - Blood, Wrist, Left [[id number]]  (Abnormal) Collected:  02/02/19 1453    Specimen:  Blood from Wrist, Left Updated:  02/04/19 0716     BCID, PCR Haemophilus influenzae. Identification by BCID PCR.    Urine Culture - Urine, Urine, Catheter In/Out [[id number]]  (Abnormal)  (Susceptibility) Collected:  02/02/19 1509    Specimen:  Urine, Catheter In/Out Updated:  02/04/19 0713     Urine Culture >100,000 CFU/mL Klebsiella pneumoniae ssp pneumoniae    Narrative:       Cefazolin results may be used to predict the potential effectiveness of oral cephalosporins for treating uncomplicated urinary tract infections.    Susceptibility      Klebsiella pneumoniae ssp pneumoniae     KAYLIN     Ampicillin Resistant     Ampicillin +  Sulbactam Susceptible     Cefazolin Susceptible     Cefepime Susceptible     Ceftriaxone Susceptible     Ertapenem Susceptible     ESBL Confirmation Test Negative     Gentamicin Susceptible     Levofloxacin Susceptible     Meropenem Susceptible     Nitrofurantoin Resistant     Piperacillin + Tazobactam Susceptible     Trimethoprim + Sulfamethoxazole Susceptible                    Blood Gas, Arterial [[id number]]  (Abnormal) Collected:  02/04/19 0418    Specimen:  Arterial Blood Updated:  02/04/19 0421     Site Left Radial     Amarjit's Test Positive     pH, Arterial 7.531 pH units      Comment: 83 Value above reference range        pCO2, Arterial 32.4 mm Hg      Comment: 84 Value below reference range        pO2, Arterial 95.0 mm Hg      HCO3, Arterial 27.1 mmol/L      Comment: 83 Value above reference range        Base Excess, Arterial 4.6 mmol/L      Comment: 83 Value above reference range        O2 Saturation, Arterial 97.9 %      Temperature 37.0 C      Barometric Pressure for Blood Gas 747 mmHg      Modality BiPap     FIO2 70 %      Ventilator Mode NA     IPAP 12     Comment: Meter: [id number]     :  [id number]        EPAP 5     Collected by [id number]    Comprehensive Metabolic Panel [[id number]]  (Abnormal) Collected:  02/04/19 0241    Specimen:  Blood Updated:  02/04/19 0316     Glucose 85 mg/dL      BUN 20 mg/dL      Creatinine 0.92 mg/dL      Sodium 151 mmol/L      Potassium 3.1 mmol/L      Chloride 121 mmol/L      CO2 24.0 mmol/L      Calcium 6.9 mg/dL      Total Protein 4.4 g/dL      Albumin 2.30 g/dL      ALT (SGPT) 41 U/L      AST (SGOT) 39 U/L      Alkaline Phosphatase 69 U/L      Total Bilirubin 1.1 mg/dL      eGFR Non African Amer 78 mL/min/1.73      Globulin 2.1 gm/dL      A/G Ratio 1.1 g/dL      BUN/Creatinine Ratio 21.7     Anion Gap 6.0 mmol/L     Narrative:       The MDRD GFR formula is only valid for adults with stable renal function between ages 18 and 70.    Magnesium [[id number]]   (Normal) Collected:  02/04/19 0241    Specimen:  Blood Updated:  02/04/19 0316     Magnesium 1.9 mg/dL     Phosphorus [[id number]]  (Normal) Collected:  02/04/19 0241    Specimen:  Blood Updated:  02/04/19 0316     Phosphorus 3.0 mg/dL     Lactic Acid, Plasma [[id number]]  (Abnormal) Collected:  02/04/19 0241    Specimen:  Blood Updated:  02/04/19 0316     Lactate 3.4 mmol/L     Blood Gas, Arterial [[id number]]  (Abnormal) Collected:  02/04/19 0300    Specimen:  Arterial Blood Updated:  02/04/19 0309     Site Left Radial     Amarjit's Test Positive     pH, Arterial 7.543 pH units      Comment: 83 Value above reference range        pCO2, Arterial 31.5 mm Hg      Comment: 84 Value below reference range        pO2, Arterial 46.7 mm Hg      Comment: 85 Value below critical limit        HCO3, Arterial 27.1 mmol/L      Comment: 83 Value above reference range        Base Excess, Arterial 4.9 mmol/L      Comment: 83 Value above reference range        O2 Saturation, Arterial 85.8 %      Comment: 84 Value below reference range        Temperature 37.0 C      Barometric Pressure for Blood Gas 747 mmHg      Modality Nasal Cannula     Flow Rate 4.0 lpm      Ventilator Mode NA     Notified [facility] [id number]     Notified By [id number]     Notified Time 02/04/2019 03:09     Collected by [id number]     Comment: Meter: [id number]     :  [id number]       CBC (No Diff) [[id number]]  (Abnormal) Collected:  02/04/19 0241    Specimen:  Blood Updated:  02/04/19 0258     WBC 15.17 10*3/mm3      RBC 4.28 10*6/mm3      Hemoglobin 12.4 g/dL      Hematocrit 38.3 %      MCV 89.5 fL      MCH 29.0 pg      MCHC 32.4 g/dL      RDW 15.9 %      RDW-SD 51.2 fl      MPV 12.1 fL      Platelets 126 10*3/mm3     Lactic Acid, Plasma [[id number]]  (Abnormal) Collected:  02/03/19 2152    Specimen:  Blood Updated:  02/03/19 2229     Lactate 4.7 mmol/L     Lactic Acid, Plasma [[id number]]  (Abnormal) Collected:  02/03/19 1831    Specimen:  Blood Updated:  02/03/19 1850      Lactate 6.4 mmol/L     Basic Metabolic Panel [[id number]]  (Abnormal) Collected:  02/03/19 1450    Specimen:  Blood Updated:  02/03/19 1513     Glucose 106 mg/dL      BUN 23 mg/dL      Creatinine 1.09 mg/dL      Sodium 154 mmol/L      Potassium 3.3 mmol/L      Chloride 117 mmol/L      CO2 27.0 mmol/L      Calcium 7.3 mg/dL      eGFR Non African Amer 64 mL/min/1.73      BUN/Creatinine Ratio 21.1     Anion Gap 10.0 mmol/L     Narrative:       The MDRD GFR formula is only valid for adults with stable renal function between ages 18 and 70.    Magnesium [[id number]]  (Normal) Collected:  02/03/19 1450    Specimen:  Blood Updated:  02/03/19 1513     Magnesium 1.8 mg/dL     Lactic Acid, Plasma [[id number]]  (Abnormal) Collected:  02/03/19 1450    Specimen:  Blood Updated:  02/03/19 1512     Lactate 7.2 mmol/L     Blood Gas, Arterial [[id number]]  (Abnormal) Collected:  02/03/19 1313    Specimen:  Arterial Blood Updated:  02/03/19 1319     Site Left Radial     Amarjit's Test Positive     pH, Arterial 7.545 pH units      Comment: 83 Value above reference range        pCO2, Arterial 29.7 mm Hg      Comment: 84 Value below reference range        pO2, Arterial 63.8 mm Hg      Comment: 84 Value below reference range        HCO3, Arterial 25.6 mmol/L      Base Excess, Arterial 3.8 mmol/L      Comment: 83 Value above reference range        O2 Saturation, Arterial 94.5 %      Temperature 37.0 C      Barometric Pressure for Blood Gas 749 mmHg      Modality Ventilator     FIO2 30 %      Ventilator Mode PSV     PEEP 5.0     PSV 5.0 cmH2O      Collected by [id number]     Comment: Meter: [id number]     :  [id number]       Procalcitonin [[id number]]  (Abnormal) Collected:  02/03/19 1050    Specimen:  Blood Updated:  02/03/19 1240     Procalcitonin 7.89 ng/mL     Narrative:       SIRS, sepsis, severe sepsis, and septic shock are categorized according to the criteria of the consensus conference of the American College of Chest  Physicians/Society of Critical Care Medicine.    PCT < 0.5 ng/mL     Systemic infection (sepsis) is not likely.    PCT >0.5 and < 2.0 ng/mL Systemic infection (sepsis) is possible, but other conditions are known to elevate PCT as well.    PCT > 2.0 ng/mL     Systemic infection (sepsis) is likely, unless other causes are known.      PCT > 10.0 ng/mL    Important systemic inflammatory response, almost exclusively due to severe bacterial sepsis or septic shock.    PCT values of < 0.5 ng/mL do not exclude an infection, because localized infections (without systemic signs) may be associated with such low concentrations, or a systemic infection in its initial stages (<6 hours).  Increased PCT can occur without infection.  PCT concentrations between 0.5 and 2.0 ng/mL should be interpreted taking into account the patients history.  It is recommended to retest PCT within 6-24 hours if any concentrations < 2.0 ng/mL are obtained.    Lactic Acid, Plasma [[id number]]  (Abnormal) Collected:  02/03/19 1050    Specimen:  Blood Updated:  02/03/19 1126     Lactate 5.7 mmol/L     Magnesium [[id number]]  (Normal) Collected:  02/03/19 0532    Specimen:  Blood Updated:  02/03/19 0658     Magnesium 1.4 mg/dL     CBC & Differential [[id number]] Collected:  02/03/19 0620    Specimen:  Blood Updated:  02/03/19 0653    Narrative:       The following orders were created for panel order CBC & Differential.  Procedure                               Abnormality         Status                     ---------                               -----------         ------                     Manual Differential[[id number]]          Abnormal            Final result               CBC Auto Differential[[id number]]        Abnormal            Final result                 Please view results for these tests on the individual orders.    CBC Auto Differential [[id number]]  (Abnormal) Collected:  02/03/19 0620    Specimen:  Blood Updated:  02/03/19 0653     WBC 9.61  10*3/mm3      RBC 4.26 10*6/mm3      Hemoglobin 12.2 g/dL      Hematocrit 38.2 %      MCV 89.7 fL      MCH 28.6 pg      MCHC 31.9 g/dL      RDW 15.8 %      RDW-SD 51.0 fl      MPV 11.8 fL      Platelets 153 10*3/mm3     Manual Differential [[id number]]  (Abnormal) Collected:  02/03/19 0620    Specimen:  Blood Updated:  02/03/19 0653     Neutrophil % 58.6 %      Lymphocyte % 6.1 %      Monocyte % 3.0 %      Bands %  24.2 %      Metamyelocyte % 3.0 %      Myelocyte % 2.0 %      Atypical Lymphocyte % 3.0 %      Neutrophils Absolute 7.96 10*3/mm3      Lymphocytes Absolute 0.59 10*3/mm3      Monocytes Absolute 0.29 10*3/mm3      Crenated RBC's Mod/2+     Poikilocytes Slight/1+     Polychromasia Slight/1+     WBC Morphology Normal     Platelet Morphology Normal    Lactic Acid, Plasma [[id number]]  (Abnormal) Collected:  02/03/19 0532    Specimen:  Blood Updated:  02/03/19 0610     Lactate 6.0 mmol/L     Comprehensive Metabolic Panel [[id number]]  (Abnormal) Collected:  02/03/19 0532    Specimen:  Blood Updated:  02/03/19 0610     Glucose 180 mg/dL      BUN 22 mg/dL      Creatinine 1.15 mg/dL      Sodium 153 mmol/L      Potassium 2.8 mmol/L      Chloride 119 mmol/L      CO2 27.0 mmol/L      Calcium 7.0 mg/dL      Total Protein 4.6 g/dL      Albumin 2.30 g/dL      ALT (SGPT) 45 U/L      AST (SGOT) 41 U/L      Alkaline Phosphatase 51 U/L      Total Bilirubin 1.1 mg/dL      eGFR Non African Amer 61 mL/min/1.73      Globulin 2.3 gm/dL      A/G Ratio 1.0 g/dL      BUN/Creatinine Ratio 19.1     Anion Gap 7.0 mmol/L     Narrative:       The MDRD GFR formula is only valid for adults with stable renal function between ages 18 and 70.    CBC & Differential [[id number]] Collected:  02/03/19 0430    Specimen:  Blood Updated:  02/03/19 0551    Narrative:       The following orders were created for panel order CBC & Differential.  Procedure                               Abnormality         Status                     ---------                                -----------         ------                     Manual Differential[[id number]]          Abnormal            Final result               CBC Auto Differential[[id number]]        Abnormal            Final result                 Please view results for these tests on the individual orders.    CBC Auto Differential [[id number]]  (Abnormal) Collected:  02/03/19 0430    Specimen:  Blood Updated:  02/03/19 0551     WBC 8.28 10*3/mm3      RBC 4.23 10*6/mm3      Hemoglobin 12.5 g/dL      Hematocrit 38.5 %      MCV 91.0 fL      MCH 29.6 pg      MCHC 32.5 g/dL      RDW 15.9 %      RDW-SD 51.6 fl      MPV 11.6 fL      Platelets 159 10*3/mm3     Manual Differential [[id number]]  (Abnormal) Collected:  02/03/19 0430    Specimen:  Blood Updated:  02/03/19 0551     Neutrophil % 59.0 %      Lymphocyte % 6.0 %      Monocyte % 1.0 %      Bands %  23.0 %      Metamyelocyte % 5.0 %      Atypical Lymphocyte % 6.0 %      Neutrophils Absolute 6.79 10*3/mm3      Lymphocytes Absolute 0.50 10*3/mm3      Monocytes Absolute 0.08 10*3/mm3      Crenated RBC's Slight/1+     Poikilocytes Slight/1+     Polychromasia Slight/1+     WBC Morphology Normal     Clumped Platelets Present    Troponin [[id number]]  (Abnormal) Collected:  02/03/19 0430    Specimen:  Blood Updated:  02/03/19 0536     Troponin I 0.413 ng/mL     Magnesium [[id number]]  (Abnormal) Collected:  02/03/19 0430    Specimen:  Blood Updated:  02/03/19 0524     Magnesium 1.3 mg/dL     Phosphorus [[id number]]  (Abnormal) Collected:  02/03/19 0430    Specimen:  Blood Updated:  02/03/19 0524     Phosphorus 2.1 mg/dL     Protime-INR [[id number]]  (Abnormal) Collected:  02/03/19 0430    Specimen:  Blood Updated:  02/03/19 0458     Protime 22.5 Seconds      INR 1.90    aPTT [[id number]]  (Abnormal) Collected:  02/03/19 0430    Specimen:  Blood Updated:  02/03/19 0458     PTT 52.2 seconds     Blood Gas, Arterial [[id number]]  (Abnormal) Collected:  02/03/19 0257    Specimen:  Arterial Blood  Updated:  02/03/19 0257     Site Right Brachial     Amarjit's Test N/A     pH, Arterial 7.518 pH units      Comment: 83 Value above reference range        pCO2, Arterial 30.4 mm Hg      Comment: 84 Value below reference range        pO2, Arterial 93.9 mm Hg      HCO3, Arterial 24.7 mmol/L      Base Excess, Arterial 2.5 mmol/L      Comment: 83 Value above reference range        O2 Saturation, Arterial 98.3 %      Temperature 37.0 C      Barometric Pressure for Blood Gas 751 mmHg      Modality Ventilator     FIO2 60 %      Ventilator Mode PC     Set Mech Resp Rate 12.0     PEEP 5.0     PIP 20 cmH2O      Comment: Meter: [id number]     :  [id number]        Collected by [id number]    Lactic Acid, Plasma [[id number]]  (Abnormal) Collected:  02/02/19 2143    Specimen:  Blood Updated:  02/02/19 2257     Lactate 7.9 mmol/L     Troponin [[id number]]  (Abnormal) Collected:  02/02/19 2143    Specimen:  Blood Updated:  02/02/19 2212     Troponin I 0.093 ng/mL     Lactic Acid, Reflex [[id number]]  (Abnormal) Collected:  02/02/19 1908    Specimen:  Blood Updated:  02/02/19 1942     Lactate 9.0 mmol/L     Blood Gas, Arterial [[id number]]  (Abnormal) Collected:  02/02/19 1920    Specimen:  Arterial Blood Updated:  02/02/19 1927     Site Right Brachial     Amarjit's Test N/A     pH, Arterial 7.324 pH units      Comment: 84 Value below reference range        pCO2, Arterial 29.6 mm Hg      Comment: 84 Value below reference range        pO2, Arterial 71.0 mm Hg      Comment: 84 Value below reference range        HCO3, Arterial 15.4 mmol/L      Comment: 84 Value below reference range        Base Excess, Arterial -9.3 mmol/L      Comment: 84 Value below reference range        O2 Saturation, Arterial 93.4 %      Comment: 84 Value below reference range        Temperature 37.0 C      Barometric Pressure for Blood Gas 752 mmHg      Modality Ventilator     FIO2 60 %      Ventilator Mode AC     Set Tidal Volume 500     Set Mech Resp Rate 12.0      PEEP 5.0     Collected by [id number]     Comment: Meter: [id number]     :  [id number]       Lactic Acid, Reflex Timer (This will reflex a repeat order 3-3:15 hours after ordered.) [[id number]] Collected:  02/02/19 1453    Specimen:  Blood Updated:  02/02/19 1845     Extra Tube Hold for add-ons.     Comment: Auto resulted.       S. Pneumo Ag Urine or CSF - Urine, Urine, Catheter [[id number]]  (Normal) Collected:  02/02/19 1803    Specimen:  Urine, Catheter Updated:  02/02/19 1842     Strep Pneumo Ag Negative    Legionella Antigen, Urine - Urine, Urine, Catheter [[id number]]  (Normal) Collected:  02/02/19 1803    Specimen:  Urine, Catheter Updated:  02/02/19 1842     LEGIONELLA ANTIGEN, URINE Negative    Influenza Antigen, Rapid - Swab, Nasopharynx [[id number]]  (Normal) Collected:  02/02/19 1757    Specimen:  Swab from Nasopharynx Updated:  02/02/19 1834     Influenza A Ag, EIA Negative     Influenza B Ag, EIA Negative    Narrative:       Recommend confirmation of negative results by viral culture or molecular assay.    Blood Gas, Arterial [[id number]]  (Abnormal) Collected:  02/02/19 1625    Specimen:  Arterial Blood Updated:  02/02/19 1634     Site Left Brachial     Amarjit's Test N/A     pH, Arterial 6.970 pH units      Comment: 85 Value below critical limit        pCO2, Arterial 28.7 mm Hg      Comment: 84 Value below reference range        pO2, Arterial 52.8 mm Hg      Comment: 85 Value below critical limit        HCO3, Arterial 6.6 mmol/L      Comment: 84 Value below reference range        Base Excess, Arterial -22.8 mmol/L      Comment: 84 Value below reference range        O2 Saturation, Arterial 67.9 %      Comment: 84 Value below reference range        Temperature 37.0 C      Barometric Pressure for Blood Gas 753 mmHg      Modality Ventilator     FIO2 100 %      Ventilator Mode AC     Set Tidal Volume 440     Set Mech Resp Rate 20.0     PEEP 5.0     Notified Who dr vera     Notified By [id number]      Notified Time 02/02/2019 16:37     Collected by [id number]     Comment: Meter: [id number]     :  [id number]       Troponin [[id number]]  (Abnormal) Collected:  02/02/19 1453    Specimen:  Blood Updated:  02/02/19 1556     Troponin I 0.057 ng/mL     Urinalysis With Culture If Indicated - Urine, Catheter In/Out [[id number]]  (Abnormal) Collected:  02/02/19 1509    Specimen:  Urine, Catheter In/Out Updated:  02/02/19 1545     Color, UA Dark Yellow     Appearance, UA Turbid     pH, UA 5.5     Specific Gravity, UA 1.021     Glucose, UA Negative     Ketones, UA Trace     Bilirubin, UA Small (1+)     Blood, UA Moderate (2+)     Protein,  mg/dL (2+)     Leuk Esterase, UA Large (3+)     Nitrite, UA Negative     Urobilinogen, UA 1.0 E.U./dL    Urinalysis, Microscopic Only - Urine, Catheter In/Out [[id number]]  (Abnormal) Collected:  02/02/19 1509    Specimen:  Urine, Catheter In/Out Updated:  02/02/19 1545     RBC, UA 3-5 /HPF      WBC, UA 31-50 /HPF      Bacteria, UA 3+ /HPF      Squamous Epithelial Cells, UA None Seen /HPF      Hyaline Casts, UA None Seen /LPF      Methodology Manual Light Microscopy    CBC & Differential [[id number]] Collected:  02/02/19 1453    Specimen:  Blood Updated:  02/02/19 1533    Narrative:       The following orders were created for panel order CBC & Differential.  Procedure                               Abnormality         Status                     ---------                               -----------         ------                     Manual Differential[[id number]]          Abnormal            Final result               CBC Auto Differential[[id number]]        Abnormal            Final result                 Please view results for these tests on the individual orders.    CBC Auto Differential [[id number]]  (Abnormal) Collected:  02/02/19 1453    Specimen:  Blood Updated:  02/02/19 1533     WBC 21.92 10*3/mm3      RBC 5.02 10*6/mm3      Hemoglobin 14.5 g/dL      Hematocrit 48.2 %       MCV 96.0 fL      MCH 28.9 pg      MCHC 30.1 g/dL      RDW 15.6 %      RDW-SD 54.2 fl      MPV 11.6 fL      Platelets 267 10*3/mm3     Manual Differential [[id number]]  (Abnormal) Collected:  02/02/19 1453    Specimen:  Blood Updated:  02/02/19 1533     Neutrophil % 66.7 %      Lymphocyte % 18.2 %      Monocyte % 1.0 %      Bands %  14.1 %      Neutrophils Absolute 17.71 10*3/mm3      Lymphocytes Absolute 3.99 10*3/mm3      Monocytes Absolute 0.22 10*3/mm3      Poikilocytes Slight/1+     WBC Morphology Normal     Platelet Morphology Normal    Comprehensive Metabolic Panel [[id number]]  (Abnormal) Collected:  02/02/19 1453    Specimen:  Blood Updated:  02/02/19 1533     Glucose 202 mg/dL      BUN 21 mg/dL      Creatinine 1.57 mg/dL      Sodium 154 mmol/L      Potassium 4.2 mmol/L      Chloride 124 mmol/L      CO2 18.0 mmol/L      Calcium 8.4 mg/dL      Total Protein 6.0 g/dL      Albumin 3.10 g/dL      ALT (SGPT) 52 U/L      AST (SGOT) 39 U/L      Alkaline Phosphatase 85 U/L      Total Bilirubin 1.0 mg/dL      eGFR Non African Amer 42 mL/min/1.73      Globulin 2.9 gm/dL      A/G Ratio 1.1 g/dL      BUN/Creatinine Ratio 13.4     Anion Gap 12.0 mmol/L     Narrative:       The MDRD GFR formula is only valid for adults with stable renal function between ages 18 and 70.    Lactic Acid, Plasma [[id number]]  (Abnormal) Collected:  02/02/19 1453    Specimen:  Blood Updated:  02/02/19 1532     Lactate 8.2 mmol/L     BNP [[id number]]  (Abnormal) Collected:  02/02/19 1453    Specimen:  Blood Updated:  02/02/19 1527     proBNP 1,810.0 pg/mL     Protime-INR [[id number]]  (Abnormal) Collected:  02/02/19 1453    Specimen:  Blood Updated:  02/02/19 1526     Protime 18.6 Seconds      INR 1.50    Blood Gas, Arterial With Co-Ox [[id number]]  (Abnormal) Collected:  02/02/19 1440    Specimen:  Arterial Blood Updated:  02/02/19 1455     Site Left Radial     Amarjit's Test Positive     pH, Arterial 6.986 pH units      Comment: 85 Value below  critical limit        pCO2, Arterial 69.9 mm Hg      Comment: 86 Value above critical limit        pO2, Arterial 69.6 mm Hg      Comment: 84 Value below reference range        HCO3, Arterial 16.7 mmol/L      Comment: 84 Value below reference range        Base Excess, Arterial -16.1 mmol/L      Comment: 84 Value below reference range        O2 Saturation, Arterial 80.6 %      Comment: 84 Value below reference range        Hemoglobin, Blood Gas 15.5 g/dL      Hematocrit, Blood Gas 47.5 %      Oxyhemoglobin 79.3 %      Comment: 84 Value below reference range        Methemoglobin 1.00 %      Carboxyhemoglobin 0.6 %      A-a Gradiant -- mmHg      Comment: UNABLE TO CALCULATE        Temperature 37.0 C      Sodium, Arterial 161 mmol/L      Comment: 86 Value above critical limit        Potassium, Arterial 4.2 mmol/L      Barometric Pressure for Blood Gas 754 mmHg      Modality Ventilator     FIO2 100 %      Ventilator Mode AC     Set Tidal Volume 440     Set Mech Resp Rate 14.0     PEEP 5.0     Note --     Notified Who dr vera     Notified By [id number]     Notified Time 02/02/2019 14:58     Collected by [id number]     Comment: Meter: [id number]     :  [id number]        pH, Temp Corrected -- pH Units      pCO2, Temperature Corrected -- mm Hg      pO2, Temperature Corrected -- mm Hg         Imaging Results (last 7 days)     Procedure Component Value Units Date/Time    XR Chest 1 View [[id number]] Collected:  02/04/19 1523     Updated:  02/04/19 1527    Narrative:       EXAMINATION:   XR CHEST 1 VW-  2/4/2019 3:23 PM CST     HISTORY: PIC catheter     Frontal upright radiograph of the chest 2/4/2019 12:48 PM CST     COMPARISON: February 3, 2019.     FINDINGS:   Interstitial infiltrates now present in the right lung base. Left  diaphragms indistinct. This most likely represents atelectasis left  lower lobe.. Cardiac silhouette is normal. Left PIC catheters present a  cardiac monitoring lead projects over the distal tip of  the PICC  catheter but appears satisfactorily position..      The osseous structures and surrounding soft tissues demonstrate no acute  abnormality.       Impression:       1. New interstitial infiltrate right lung base. This may be atelectasis  or possibly an inflammatory process.  2. Left diaphragm is indistinct this most likely represents atelectasis  left lower lobe.        This report was finalized on 02/04/2019 15:24 by Dr. Julius Truong MD.    XR Chest 1 View [[id number]] Collected:  02/04/19 0718     Updated:  02/04/19 0723    Narrative:       EXAMINATION:  XR CHEST 1 VW-  2/4/2019 4:37 AM CST     HISTORY: R06.03-Acute respiratory distress; A41.9-Sepsis, unspecified  organism; N17.9-Acute kidney failure, unspecified; E87.2-Acidosis     COMPARISON: 2/3/2019.     FINDINGS:  The patient is now extubated. There are new bilateral  infiltrates. There is dense consolidation at the left lung base. The  inspiration is not as deep on the current study. Heart size is upper  limits of normal. There is mild blunting of the costophrenic angles  bilaterally.       Impression:       1. Hypoventilation. Patient extubated.  2. Dense consolidation in the left lung base consistent with known  pneumonia.  3. Worsening bilateral perihilar infiltrates with indistinct vessels  centrally. This is concerning for pulmonary edema.  4. Mild blunting of the costophrenic angles. There may be small pleural  effusions.        This report was finalized on 02/04/2019 07:20 by Dr. Dexter Ernandez MD.    XR Chest 1 View [[id number]] Collected:  02/03/19 0823     Updated:  02/03/19 0827    Narrative:       EXAMINATION:  XR CHEST 1 VW-  2/3/2019 3:30 AM CST     HISTORY: Patient intubated. Infiltrates.     COMPARISON: 2/2/2019.     FINDINGS:  There is now worsening consolidation at the left lung base.  There is patchy infiltrate in the right perihilar region and right lung  base. The heart is normal in size. Endotracheal tube tip is at the  T4  level.       Impression:       1. Worsening infiltrate at the left lung base. Probable pneumonia.  2. Patchy infiltrates in the right perihilar region and right lung base  are not significantly changed.        This report was finalized on 02/03/2019 08:23 by Dr. Dexter Ernandez MD.    CT Abdomen Pelvis Without Contrast [[id number]] Collected:  02/02/19 2126     Updated:  02/02/19 2132    Narrative:       EXAMINATION: CT ABDOMEN PELVIS WO CONTRAST-      2/2/2019 9:00 PM CST     HISTORY: Abd pain, gastroenteritis or colitis suspected; R06.03-Acute  respiratory distress; A41.9-Sepsis, unspecified organism; N17.9-Acute  kidney failure, unspecified; E87.2-Acidosis     In order to have a CT radiation dose as low as reasonably achievable  Automated Exposure Control was utilized for adjustment of the mA and/or  KV according to patient size.     DLP in mGycm= 146.     Large hiatal hernia with partially intrathoracic stomach.     Dependent bibasilar pneumonia.  Normal heart size.     Cholecystectomy clips.  Normal noncontrast appearance of the liver and spleen.  The pancreas shows no abnormality.     There is a 4.7 cm cyst at the upper right kidney. The kidneys show small  nonobstructing stones. There is no hydronephrosis and no ureteral stone  is seen.     Rectal fecal impaction is present.     There is no bowel obstruction or free fluid.     No aortic aneurysm.     Summary:  1. Intrathoracic stomach.  2. Bibasilar pneumonia.  3. Rectal fecal impaction.  4. No mass or abscess.                                   This report was finalized on 02/02/2019 21:28 by Dr. Salvador Perez MD.    CT Head Without Contrast [[id number]] Collected:  02/02/19 2125     Updated:  02/02/19 2129    Narrative:       EXAMINATION: CT HEAD WO CONTRAST-      2/2/2019 9:00 PM CST     HISTORY: pupils uneven; R06.03-Acute respiratory distress; A41.9-Sepsis,  unspecified organism; N17.9-Acute kidney failure, unspecified;  E87.2-Acidosis     In order to  have a CT radiation dose as low as reasonably achievable  Automated Exposure Control was utilized for adjustment of the mA and/or  KV according to patient size.     DLP in mGycm= 743.     Atrophy and small vessel disease.  No hemorrhage or mass effect.  No infarct is seen.     There is some patchy mucosal thickening within the left frontal sinus  and within left ethmoid air cells. Patchy mucosal thickening also seen  within the left side of the sphenoid sinus.     Mastoid air cells are clear.     No skull abnormality is seen.     Summary:  1. Age-related changes with atrophy and small vessel disease.  2. Sinus inflammatory disease.  3. No acute intracranial abnormality.                                   This report was finalized on 02/02/2019 21:26 by Dr. Salvador Perez MD.    US Renal Bilateral [[id number]] Collected:  02/02/19 1844     Updated:  02/02/19 1848    Narrative:       EXAMINATION: US RENAL BILATERAL-     2/2/2019 6:12 PM CST     HISTORY: acute renal failure, history of nephrolithiasis; R06.03-Acute  respiratory distress; A41.9-Sepsis, unspecified organism; N17.9-Acute  kidney failure, unspecified; E87.2-Acidosis.     Grayscale and color flow ultrasound evaluation of the kidneys compared  to 1/17/2017.     Left kidney = 9.9 x 5.9 x 4.8 cm.     Right kidney = 11.7 x 5.6 x 5.6 cm.     Nonobstructing calcification within the midportion of the left kidney.     Lower pole right renal cyst measures 1.1 x 0.9 cm.  Upper pole right renal cyst measures 4.8 x 5.4 cm.  Nonobstructing lower pole right renal calcification.     No hydronephrosis.     Summary:  1. Renal cysts with no sign of obstruction.  This report was finalized on 02/02/2019 18:45 by Dr. Salvador Perez MD.    XR Chest 1 View [[id number]] Collected:  02/02/19 1453     Updated:  02/02/19 1458    Narrative:       EXAMINATION:  XR CHEST 1 VW-  2/2/2019 2:49 PM CST     HISTORY: Patient was intubated.     COMPARISON: 12/5/2016.     FINDINGS:  The endotracheal  tube tip is at the T4 level. There is  minimal linear infiltrate in the left lung base medially. There is  minimal patchy infiltrate in the right upper and right lower lung  fields. The heart is normal in size.       Impression:       1. Endotracheal tube tip is at the T4 level.  2. Patchy infiltrates bilaterally may represent minimal pneumonia or  atelectasis.        This report was finalized on 02/02/2019 14:55 by Dr. Dexter Ernandez MD.            Hospital Course:  This is an 84-year-old  gentleman who presents by EMS from his home.  He lives in [place], [place].  He was found to be in severe respiratory distress once they arrived at his home.  He required intubation there.  He was tachycardic and hypotensive in the field.  He received fluids and Levophed.     In the emergency department, he has had a central line placed.  He has been bolused considerable fluid and has also been given sodium bicarbonate.  He remains with a severe acidosis.     His daughter provides the history.  She tells me that he was admitted a few weeks ago to [facility] in [place], [place].  He was apparently treated for pneumonia at that point in time.  He was then discharged for rehab at [facility] and did not do very well.  She ultimately elected to take him home with home health and he has been back at home with her for around 10-12 days.     She states that over the last couple days he has been more lethargic.  He has seemed more short of breath.  He has not been eating well.  She states that she believes him to also have some difficulty with swallowing and does not recall them checking this out when he was in the hospital recently.  She is unaware of any fevers, sweats, or chills.  She denies that he has been having problems with nausea or vomiting or loose stool.  She states that he has not been complaining of any pain.     He became more acutely short of breath this morning causing her to request  EMS.    Plan:   The patient presents in extremis secondary to severe sepsis and septic shock from community-acquired pneumonia and a urinary tract infection.  He will be admitted to my service here at [facility] in the intensive care unit.     The ER has ordered a total of 4560 mL's of normal saline to be given thus far.  Levophed has been administered.  Dr. Carias also gave him 2 ampoules of sodium bicarbonate for his severe systemic acidosis.     He has been administered vancomycin and Zosyn.  We will continue Zosyn and ask pharmacy to continue to dose vancomycin.  Blood cultures have been drawn.  Obtain a sputum culture.  Check Legionella and streptococcal urinary antigens.  Swab for influenza.  He has a severe lactic acidosis which will be reassessed after sepsis bolus.     Consult pulmonary to assist in his critical illness and ventilatory support.  Routine vent orders.  Transition sedation to Diprivan rather than Versed.     He does have a slight troponin elevation.  We will trend this at present.  No cardiology consultation or further workup at present.  He may ultimately require an echocardiogram.     Check urine electrolytes.  Baseline renal ultrasound.  He does have a history of nephrolithiasis and has had to see Dr. Stanley for this in the past.     Hold all home oral medications at present.  Nothing on his home medication list is absolutely necessary in his care at this point in time.     Lovenox for DVT prophylaxis.  Pepcid for peptic ulcer prophylaxis while on ventilatory support.     Code Status: Full per his family.      I discussed the patient's findings and my recommendations with the patient's family and Dr. Carias in the ER.      The family has been made aware by myself and Dr. Carias that he has an extremely high risk of mortality at this point in time.  They understand, but still would like for us to exhaust all measures in his care at this point in time.    Admission the patient's  septic shock has improved significantly overnight.  Renal function was improved and lactate was declined.  Urine output was adequate.  He remained on broad-spectrum IV Lasix and IV fluids.  CT scan of the abdomen and pelvis was performed on the night after admission showing intrathoracic stomach and bibasilar pneumonia.  The patient was subsequently extubated successfully and is tolerating nasal cannula but transition to BiPAP.  Blood cultures revealed Haemophilus and urine cultures revealed Klebsiella, both sensitive to Rocephin. Vancomycin and Zosyn were discontinued and Rocephin was initiated.  He was mildly hypotensive and saline boluses were given to maintain blood pressure.  Central femoral venous line was discontinued on day 3 and a PICC line was inserted.  Palliative care was consulted and after speaking with the patient's daughter, she elected to transition the patient to comfort care.  She was aware that the patient has a terminal diagnosis and this hospitalization will likely result in his death.  Patient continued on comfort measures throughout the remainder of his hospital stay comfortable with sublingual and morphine.  He subsequently passed away at 1545 hrs. on 2/6/19.            Dillan Tapia DO  02/06/19  4:47 PM    Time: Over 30

## 2019-02-06 NOTE — PLAN OF CARE
Problem: Skin Injury Risk (Adult)  Goal: Identify Related Risk Factors and Signs and Symptoms  Outcome: Outcome(s) achieved Date Met: 02/06/19 02/04/19 0523   Skin Injury Risk (Adult)   Related Risk Factors (Skin Injury Risk) advanced age;cognitive impairment;nutritional deficiencies;skeletal deformities     Goal: Skin Health and Integrity  Outcome: Ongoing (interventions implemented as appropriate)      Problem: Fall Risk (Adult)  Goal: Identify Related Risk Factors and Signs and Symptoms  Outcome: Outcome(s) achieved Date Met: 02/06/19 02/06/19 0532   Fall Risk (Adult)   Related Risk Factors (Fall Risk) environment unfamiliar;confusion/agitation;gait/mobility problems;culprit medication(s)   Signs and Symptoms (Fall Risk) presence of risk factors     Goal: Absence of Fall  Outcome: Ongoing (interventions implemented as appropriate)      Problem: Wound (Includes Pressure Injury) (Adult)  Goal: Signs and Symptoms of Listed Potential Problems Will be Absent, Minimized or Managed (Wound)  Outcome: Ongoing (interventions implemented as appropriate)   02/04/19 0523 02/06/19 0532   Goal/Outcome Evaluation   Problems Assessed (Wound) --  all   Problems Present (Wound) delayed wound healing;situational response --        Problem: Patient Care Overview  Goal: Plan of Care Review  Outcome: Ongoing (interventions implemented as appropriate)   02/06/19 0532   Coping/Psychosocial   Plan of Care Reviewed With patient   Plan of Care Review   Progress declining   OTHER   Outcome Summary Pt is expressing physical signs of pain as he is moaning and now gurgling. Moaning was only with turns at beginning of shift, but is now fairly frequently while laying still. SL morphine and ativan given per orders with minimal relief. Pupils fixed and pinpoint. Moderate amt of UOP in F/C. Call put in to hospitalist for possibly a scopolamine patch for increased oral secretions/gurgling and to switch pain med to IV. Have not received a call  back at this point. Turning Q2H. Continue to provide comfort care for pt.      Goal: Individualization and Mutuality  Outcome: Ongoing (interventions implemented as appropriate)    Goal: Discharge Needs Assessment  Outcome: Ongoing (interventions implemented as appropriate)      Problem: Dying Patient, Actively (Adult)  Goal: Identify Related Risk Factors and Signs and Symptoms  Outcome: Outcome(s) achieved Date Met: 02/06/19 02/06/19 0532   Dying Patient, Actively (Adult)   Related Risk Factors (Actively Dying Patient) level of consciousness;family dynamics;disease progression;dyspnea;impaired swallowing   Signs and Symptoms (Actively Dying Patient) agitation/restlessness;awareness reduced;dyspnea;pain;swallowing difficulty;total dependency     Goal: Comfort/Pain Control  Outcome: Ongoing (interventions implemented as appropriate)    Goal: Peace/Preservation of Dignity During the Dying Process  Outcome: Ongoing (interventions implemented as appropriate)

## 2019-02-06 NOTE — PROGRESS NOTES
[facility] Medicine Services  INPATIENT PROGRESS NOTE    Length of Stay: 4  Date of Admission: 2/2/2019  Primary Care Physician: Tamiko Vizcaino MD    Subjective     Chief Complaint:     Comfort measures    HPI     The patient's condition has significantly worsened.  His respiratory effort is increased and he has audible rales noted.  The only response the patient has with stimulus is moaning.  He continues to receive morphine and Ativan as needed to maintain comfort.  It appears as though the patient is unlikely to survive through the night.    Review of Systems     All pertinent negatives and positives are as above. All other systems have been reviewed and are negative unless otherwise stated.     Objective    Temp:  [97.6 °F (36.4 °C)-97.8 °F (36.6 °C)] 97.8 °F (36.6 °C)  Heart Rate:  [] 101  Resp:  [16-20] 19  BP: (101-104)/(69-74) 101/74    Lab Results (last 24 hours)     Procedure Component Value Units Date/Time    Blood Culture - Blood, Wrist, Left [[id number]]  (Abnormal)  (Susceptibility) Collected:  02/02/19 1453    Specimen:  Blood from Wrist, Left Updated:  02/06/19 1034     Blood Culture Haemophilus influenzae     Isolated from Pediatric Bottle     BETA LACTAMASE Positive     Gram Stain Gram negative bacilli    Narrative:       No anaerobic bottle collected.     Susceptibility      Haemophilus influenzae     DISK DIFFUSION     Cefotaxime Susceptible     Ciprofloxacin Susceptible     Trimethoprim + Sulfamethoxazole Resistant                    Blood Culture - Blood, Leg, Right [[id number]] Collected:  02/02/19 1453    Specimen:  Blood from Leg, Right Updated:  02/05/19 1615     Blood Culture No growth at 3 days          Imaging Results (last 24 hours)     ** No results found for the last 24 hours. **             Intake/Output Summary (Last 24 hours) at 2/6/2019 1452  Last data filed at 2/6/2019 0500  Gross per 24 hour   Intake 0 ml   Output 550 ml   Net -550 ml        Physical Exam    Constitutional: He appears well-developed. He appears cachectic.  Breathing is labored.  HENT:   Head: Normocephalic and atraumatic.   Cardiovascular: Normal rate and regular rhythm.   Pulmonary/Chest: Effort normal. He has  rhonchi ( Bilaterally).   Abdominal: Soft. Bowel sounds are normal.   Neurological: He responds to stimulus by moaning.   Skin: Skin is warm and dry.         Results Review:  I have reviewed the labs, radiology results, and diagnostic studies since my last progress note and made treatment changes reflective of the results.   I have reviewed the current medications.    Assessment/Plan     Active Hospital Problems    Diagnosis   • **Acute respiratory failure with hypoxia (CMS/HCC)   • Hypokalemia   • Hypophosphatemia   • Hypomagnesemia   • Severe sepsis (CMS/HCC)   • Shock, septic (CMS/HCC)   • Bilateral pneumonia   • Lactic acidosis   • Leukocytosis   • Sinus tachycardia   • Elevated troponin   • Acute renal failure (ARF) (CMS/HCC)   • Hypernatremia   • Acute UTI (urinary tract infection)       PLAN:  Continue comfort measures care    Dillan Tapia DO   02/06/19   2:52 PM

## 2019-02-07 LAB — BACTERIA SPEC AEROBE CULT: NORMAL

## 2019-02-07 NOTE — THERAPY DISCHARGE NOTE
Acute Care - Speech Language Pathology Discharge Summary  [facility]       Patient Name: Jb Verde  : 1934  MRN: [id number]    Today's Date: 2019                   Admit Date: 2019      SLP Recommendation and Plan    Visit Dx:    ICD-10-CM ICD-9-CM   1. Respiratory distress R06.03 786.09   2. Sepsis, due to unspecified organism (CMS/[facility]) A41.9 038.9     995.91   3. DEMETRIS (acute kidney injury) (CMS/[facility]) N17.9 584.9   4. Metabolic acidosis E87.2 276.2   5. Oropharyngeal dysphagia R13.12 787.22               SLP GOALS     Row Name 19 1600 19 0930          Oral Nutrition/Hydration Goal 1 (SLP)    Oral Nutrition/Hydration Goal 1, SLP  Pt will tolerate PO for comfort without increased lung congestion.   -MB  Pt will tolerate PO for comfort without increased lung congestion.   -TM     Time Frame (Oral Nutrition/Hydration Goal 1, SLP)  by discharge  -MB  by discharge  -TM     Barriers (Oral Nutrition/Hydration Goal 1, SLP)  Medically complex  -MB  Medically complex  -TM     Progress/Outcomes (Oral Nutrition/Hydration Goal 1, SLP)  goal not met  -MB  goal ongoing  -TM       User Key  (r) = Recorded By, (t) = Taken By, (c) = Cosigned By    Initials Name Provider Type    John Thompson, CCC-SLP Speech and Language Pathologist    TM Yessenia Aragon CCC-SLP Speech and Language Pathologist                  SLP Discharge Summary  Anticipated Dischage Disposition: extended care facility  Reason for Discharge: other (see comments)(Pt )  Progress Toward Achieving Short/long Term Goals: other (see comments)(Pt )  Discharge Destination: other (see comments)(Pt )      John Soliman CCC-SLP  2019

## 2023-03-06 NOTE — PROGRESS NOTES
"Pharmacy Dosing Service  Pharmacokinetics  Vancomycin Initial Evaluation    Assessment/Action/Plan:  Initiated Vancomycin 1000 mg IVPB once, followed by 500 mg every 24 hours. Vancomycin levels not ordered at this time. Pharmacy will monitor renal function and adjust dose accordingly.     Subjective:  Jb Verde is a 84 y.o. male with a Vancomycin \"Pharmacy to Dose\" consult for the treatment of Sepsis/PNA .    Objective:  Ht: 182.9 cm (72\"); Wt: 52 kg (114 lb 11.2 oz)  Estimated Creatinine Clearance: 25.8 mL/min (A) (by C-G formula based on SCr of 1.57 mg/dL (H)).   Lab Results   Component Value Date    CREATININE 1.57 (H) 02/02/2019    CREATININE 0.88 12/05/2016    CREATININE 0.83 11/09/2016      Lab Results   Component Value Date    WBC 21.92 (H) 02/02/2019    WBC 8.21 12/05/2016    WBC 7.47 11/09/2016      Baseline culture results:  Microbiology Results (last 10 days)       ** No results found for the last 240 hours. **            Josiane Higuera, PharmD  02/02/19 4:39 PM    " Humira Counseling:  I discussed with the patient the risks of adalimumab including but not limited to myelosuppression, immunosuppression, autoimmune hepatitis, demyelinating diseases, lymphoma, and serious infections.  The patient understands that monitoring is required including a PPD at baseline and must alert us or the primary physician if symptoms of infection or other concerning signs are noted.